# Patient Record
Sex: MALE | Race: AMERICAN INDIAN OR ALASKA NATIVE | NOT HISPANIC OR LATINO | URBAN - METROPOLITAN AREA
[De-identification: names, ages, dates, MRNs, and addresses within clinical notes are randomized per-mention and may not be internally consistent; named-entity substitution may affect disease eponyms.]

---

## 2017-03-11 ENCOUNTER — OUTPATIENT (OUTPATIENT)
Dept: OUTPATIENT SERVICES | Facility: HOSPITAL | Age: 72
LOS: 1 days | Discharge: HOME | End: 2017-03-11

## 2017-06-27 DIAGNOSIS — Z79.891 LONG TERM (CURRENT) USE OF OPIATE ANALGESIC: ICD-10-CM

## 2017-06-27 DIAGNOSIS — N18.4 CHRONIC KIDNEY DISEASE, STAGE 4 (SEVERE): ICD-10-CM

## 2017-06-27 DIAGNOSIS — Z94.1 HEART TRANSPLANT STATUS: ICD-10-CM

## 2017-10-20 ENCOUNTER — APPOINTMENT (OUTPATIENT)
Dept: VASCULAR SURGERY | Facility: CLINIC | Age: 72
End: 2017-10-20
Payer: COMMERCIAL

## 2017-10-20 VITALS
DIASTOLIC BLOOD PRESSURE: 54 MMHG | HEIGHT: 63 IN | SYSTOLIC BLOOD PRESSURE: 110 MMHG | BODY MASS INDEX: 26.75 KG/M2 | WEIGHT: 151 LBS

## 2017-10-20 DIAGNOSIS — E03.9 HYPOTHYROIDISM, UNSPECIFIED: ICD-10-CM

## 2017-10-20 DIAGNOSIS — Z87.891 PERSONAL HISTORY OF NICOTINE DEPENDENCE: ICD-10-CM

## 2017-10-20 DIAGNOSIS — N18.4 CHRONIC KIDNEY DISEASE, STAGE 4 (SEVERE): ICD-10-CM

## 2017-10-20 DIAGNOSIS — I10 ESSENTIAL (PRIMARY) HYPERTENSION: ICD-10-CM

## 2017-10-20 DIAGNOSIS — I25.10 ATHEROSCLEROTIC HEART DISEASE OF NATIVE CORONARY ARTERY W/OUT ANGINA PECTORIS: ICD-10-CM

## 2017-10-20 DIAGNOSIS — E11.9 TYPE 2 DIABETES MELLITUS W/OUT COMPLICATIONS: ICD-10-CM

## 2017-10-20 PROBLEM — Z00.00 ENCOUNTER FOR PREVENTIVE HEALTH EXAMINATION: Status: ACTIVE | Noted: 2017-10-20

## 2017-10-20 PROCEDURE — 99203 OFFICE O/P NEW LOW 30 MIN: CPT

## 2017-10-20 PROCEDURE — 93925 LOWER EXTREMITY STUDY: CPT

## 2017-10-20 PROCEDURE — 93978 VASCULAR STUDY: CPT

## 2017-10-20 RX ORDER — AMLODIPINE BESYLATE 10 MG/1
10 TABLET ORAL
Refills: 0 | Status: ACTIVE | COMMUNITY

## 2017-10-20 RX ORDER — INSULIN DEGLUDEC INJECTION 200 U/ML
INJECTION, SOLUTION SUBCUTANEOUS
Refills: 0 | Status: ACTIVE | COMMUNITY

## 2017-10-20 RX ORDER — PENTOXIFYLLINE 400 MG/1
400 TABLET, EXTENDED RELEASE ORAL
Refills: 0 | Status: ACTIVE | COMMUNITY

## 2017-10-20 RX ORDER — ROSUVASTATIN CALCIUM 20 MG/1
20 TABLET, FILM COATED ORAL
Refills: 0 | Status: ACTIVE | COMMUNITY

## 2017-10-20 RX ORDER — INSULIN LISPRO 100 [IU]/ML
INJECTION, SOLUTION INTRAVENOUS; SUBCUTANEOUS
Refills: 0 | Status: ACTIVE | COMMUNITY

## 2017-10-20 RX ORDER — GINGER ROOT/GINGER ROOT EXT 262.5 MG
CAPSULE ORAL
Refills: 0 | Status: ACTIVE | COMMUNITY

## 2017-10-20 RX ORDER — GLUCOSAMINE/MSM/CHONDROIT SULF 500-166.6
TABLET ORAL
Refills: 0 | Status: ACTIVE | COMMUNITY

## 2017-10-20 RX ORDER — PREDNISONE 1 MG/1
1 TABLET ORAL
Refills: 0 | Status: ACTIVE | COMMUNITY

## 2017-10-20 RX ORDER — LEVOTHYROXINE SODIUM 0.17 MG/1
TABLET ORAL
Refills: 0 | Status: ACTIVE | COMMUNITY

## 2017-10-20 RX ORDER — FERROUS SULFATE 325(65) MG
325 (65 FE) TABLET ORAL
Refills: 0 | Status: ACTIVE | COMMUNITY

## 2017-10-20 RX ORDER — MYCOPHENOLATE MOFETIL 250 MG/1
250 CAPSULE ORAL
Refills: 0 | Status: ACTIVE | COMMUNITY

## 2017-10-20 RX ORDER — EPLERENONE 50 MG/1
50 TABLET, COATED ORAL
Refills: 0 | Status: ACTIVE | COMMUNITY

## 2017-10-20 RX ORDER — LOSARTAN POTASSIUM 100 MG/1
100 TABLET, FILM COATED ORAL
Refills: 0 | Status: ACTIVE | COMMUNITY

## 2017-10-20 RX ORDER — FENOFIBRATE 54 MG/1
54 TABLET ORAL
Refills: 0 | Status: ACTIVE | COMMUNITY

## 2017-10-20 RX ORDER — DOCUSATE SODIUM 100 MG/1
TABLET ORAL
Refills: 0 | Status: ACTIVE | COMMUNITY

## 2017-10-20 RX ORDER — ASPIRIN 325 MG/1
325 TABLET, FILM COATED ORAL
Refills: 0 | Status: ACTIVE | COMMUNITY

## 2018-01-13 ENCOUNTER — OUTPATIENT (OUTPATIENT)
Dept: OUTPATIENT SERVICES | Facility: HOSPITAL | Age: 73
LOS: 1 days | Discharge: HOME | End: 2018-01-13

## 2018-01-13 DIAGNOSIS — I10 ESSENTIAL (PRIMARY) HYPERTENSION: ICD-10-CM

## 2018-01-13 DIAGNOSIS — R10.9 UNSPECIFIED ABDOMINAL PAIN: ICD-10-CM

## 2018-01-13 DIAGNOSIS — R79.82 ELEVATED C-REACTIVE PROTEIN (CRP): ICD-10-CM

## 2018-04-20 ENCOUNTER — APPOINTMENT (OUTPATIENT)
Dept: VASCULAR SURGERY | Facility: CLINIC | Age: 73
End: 2018-04-20
Payer: COMMERCIAL

## 2018-04-20 VITALS
DIASTOLIC BLOOD PRESSURE: 64 MMHG | BODY MASS INDEX: 26.58 KG/M2 | HEIGHT: 63 IN | WEIGHT: 150 LBS | SYSTOLIC BLOOD PRESSURE: 118 MMHG

## 2018-04-20 DIAGNOSIS — I73.9 PERIPHERAL VASCULAR DISEASE, UNSPECIFIED: ICD-10-CM

## 2018-04-20 PROCEDURE — 99213 OFFICE O/P EST LOW 20 MIN: CPT

## 2018-04-20 PROCEDURE — 93880 EXTRACRANIAL BILAT STUDY: CPT

## 2018-07-26 ENCOUNTER — EMERGENCY (EMERGENCY)
Facility: HOSPITAL | Age: 73
LOS: 0 days | Discharge: HOME | End: 2018-07-26
Attending: EMERGENCY MEDICINE | Admitting: EMERGENCY MEDICINE

## 2018-07-26 VITALS
WEIGHT: 149.91 LBS | HEIGHT: 63 IN | SYSTOLIC BLOOD PRESSURE: 166 MMHG | OXYGEN SATURATION: 97 % | RESPIRATION RATE: 20 BRPM | HEART RATE: 107 BPM | TEMPERATURE: 97 F | DIASTOLIC BLOOD PRESSURE: 74 MMHG

## 2018-07-26 VITALS — SYSTOLIC BLOOD PRESSURE: 124 MMHG | TEMPERATURE: 97 F | HEART RATE: 96 BPM | DIASTOLIC BLOOD PRESSURE: 61 MMHG

## 2018-07-26 DIAGNOSIS — Z79.82 LONG TERM (CURRENT) USE OF ASPIRIN: ICD-10-CM

## 2018-07-26 DIAGNOSIS — E03.9 HYPOTHYROIDISM, UNSPECIFIED: ICD-10-CM

## 2018-07-26 DIAGNOSIS — Z79.4 LONG TERM (CURRENT) USE OF INSULIN: ICD-10-CM

## 2018-07-26 DIAGNOSIS — Z94.1 HEART TRANSPLANT STATUS: Chronic | ICD-10-CM

## 2018-07-26 DIAGNOSIS — R39.89 OTHER SYMPTOMS AND SIGNS INVOLVING THE GENITOURINARY SYSTEM: ICD-10-CM

## 2018-07-26 DIAGNOSIS — N18.4 CHRONIC KIDNEY DISEASE, STAGE 4 (SEVERE): ICD-10-CM

## 2018-07-26 DIAGNOSIS — N50.811 RIGHT TESTICULAR PAIN: ICD-10-CM

## 2018-07-26 DIAGNOSIS — I12.9 HYPERTENSIVE CHRONIC KIDNEY DISEASE WITH STAGE 1 THROUGH STAGE 4 CHRONIC KIDNEY DISEASE, OR UNSPECIFIED CHRONIC KIDNEY DISEASE: ICD-10-CM

## 2018-07-26 DIAGNOSIS — R10.31 RIGHT LOWER QUADRANT PAIN: ICD-10-CM

## 2018-07-26 DIAGNOSIS — Z79.899 OTHER LONG TERM (CURRENT) DRUG THERAPY: ICD-10-CM

## 2018-07-26 DIAGNOSIS — Z95.5 PRESENCE OF CORONARY ANGIOPLASTY IMPLANT AND GRAFT: ICD-10-CM

## 2018-07-26 DIAGNOSIS — N50.812 LEFT TESTICULAR PAIN: ICD-10-CM

## 2018-07-26 DIAGNOSIS — R10.30 LOWER ABDOMINAL PAIN, UNSPECIFIED: ICD-10-CM

## 2018-07-26 DIAGNOSIS — E11.9 TYPE 2 DIABETES MELLITUS WITHOUT COMPLICATIONS: ICD-10-CM

## 2018-07-26 LAB
ALBUMIN SERPL ELPH-MCNC: 4.2 G/DL — SIGNIFICANT CHANGE UP (ref 3.5–5.2)
ALP SERPL-CCNC: 49 U/L — SIGNIFICANT CHANGE UP (ref 30–115)
ALT FLD-CCNC: 16 U/L — SIGNIFICANT CHANGE UP (ref 0–41)
ANION GAP SERPL CALC-SCNC: 16 MMOL/L — HIGH (ref 7–14)
APPEARANCE UR: CLEAR — SIGNIFICANT CHANGE UP
AST SERPL-CCNC: 16 U/L — SIGNIFICANT CHANGE UP (ref 0–41)
BILIRUB SERPL-MCNC: <0.2 MG/DL — SIGNIFICANT CHANGE UP (ref 0.2–1.2)
BILIRUB UR-MCNC: NEGATIVE — SIGNIFICANT CHANGE UP
BUN SERPL-MCNC: 36 MG/DL — HIGH (ref 10–20)
CALCIUM SERPL-MCNC: 9.7 MG/DL — SIGNIFICANT CHANGE UP (ref 8.5–10.1)
CHLORIDE SERPL-SCNC: 103 MMOL/L — SIGNIFICANT CHANGE UP (ref 98–110)
CO2 SERPL-SCNC: 21 MMOL/L — SIGNIFICANT CHANGE UP (ref 17–32)
COLOR SPEC: YELLOW — SIGNIFICANT CHANGE UP
CREAT SERPL-MCNC: 2.6 MG/DL — HIGH (ref 0.7–1.5)
DIFF PNL FLD: ABNORMAL
EPI CELLS # UR: ABNORMAL /HPF
GLUCOSE SERPL-MCNC: 239 MG/DL — HIGH (ref 70–99)
GLUCOSE UR QL: 250
HCT VFR BLD CALC: 33.2 % — LOW (ref 42–52)
HGB BLD-MCNC: 11.1 G/DL — LOW (ref 14–18)
KETONES UR-MCNC: NEGATIVE — SIGNIFICANT CHANGE UP
LACTATE SERPL-SCNC: 1.5 MMOL/L — SIGNIFICANT CHANGE UP (ref 0.5–2.2)
LEUKOCYTE ESTERASE UR-ACNC: NEGATIVE — SIGNIFICANT CHANGE UP
LIDOCAIN IGE QN: 62 U/L — HIGH (ref 7–60)
MCHC RBC-ENTMCNC: 29.8 PG — SIGNIFICANT CHANGE UP (ref 27–31)
MCHC RBC-ENTMCNC: 33.4 G/DL — SIGNIFICANT CHANGE UP (ref 32–37)
MCV RBC AUTO: 89 FL — SIGNIFICANT CHANGE UP (ref 80–94)
NITRITE UR-MCNC: NEGATIVE — SIGNIFICANT CHANGE UP
NRBC # BLD: 0 /100 WBCS — SIGNIFICANT CHANGE UP (ref 0–0)
PH UR: 5.5 — SIGNIFICANT CHANGE UP (ref 5–8)
PLATELET # BLD AUTO: 261 K/UL — SIGNIFICANT CHANGE UP (ref 130–400)
POTASSIUM SERPL-MCNC: 4.4 MMOL/L — SIGNIFICANT CHANGE UP (ref 3.5–5)
POTASSIUM SERPL-SCNC: 4.4 MMOL/L — SIGNIFICANT CHANGE UP (ref 3.5–5)
PROT SERPL-MCNC: 6.7 G/DL — SIGNIFICANT CHANGE UP (ref 6–8)
PROT UR-MCNC: >=300 MG/DL — SIGNIFICANT CHANGE UP
RBC # BLD: 3.73 M/UL — LOW (ref 4.7–6.1)
RBC # FLD: 13.2 % — SIGNIFICANT CHANGE UP (ref 11.5–14.5)
RBC CASTS # UR COMP ASSIST: SIGNIFICANT CHANGE UP /HPF
SODIUM SERPL-SCNC: 140 MMOL/L — SIGNIFICANT CHANGE UP (ref 135–146)
SP GR SPEC: 1.01 — SIGNIFICANT CHANGE UP (ref 1.01–1.03)
UROBILINOGEN FLD QL: 0.2 — SIGNIFICANT CHANGE UP (ref 0.2–0.2)
WBC # BLD: 6.93 K/UL — SIGNIFICANT CHANGE UP (ref 4.8–10.8)
WBC # FLD AUTO: 6.93 K/UL — SIGNIFICANT CHANGE UP (ref 4.8–10.8)
WBC UR QL: SIGNIFICANT CHANGE UP /HPF

## 2018-07-26 RX ORDER — SODIUM CHLORIDE 9 MG/ML
1000 INJECTION INTRAMUSCULAR; INTRAVENOUS; SUBCUTANEOUS ONCE
Qty: 0 | Refills: 0 | Status: COMPLETED | OUTPATIENT
Start: 2018-07-26 | End: 2018-07-26

## 2018-07-26 RX ADMIN — SODIUM CHLORIDE 1000 MILLILITER(S): 9 INJECTION INTRAMUSCULAR; INTRAVENOUS; SUBCUTANEOUS at 14:27

## 2018-07-26 NOTE — ED PROVIDER NOTE - CARE PLAN
Principal Discharge DX:	Testicular pain Principal Discharge DX:	Testicular pain  Secondary Diagnosis:	Abdominal pain

## 2018-07-26 NOTE — ED PROVIDER NOTE - PHYSICAL EXAMINATION
PHYSICAL EXAM:    GENERAL: Alert, appears stated age, well appearing, non-toxic  SKIN: Warm, pink and dry. MMM.   EYE: Normal lids/conjunctiva  ENT: Normal hearing, patent oropharynx  NECK: +supple. No meningismus, JVD.   Pulm: Bilateral BS, normal resp effort, no wheezes, stridor, or retractions  CV: RRR, no M/R/G, 2+ pulses   Abd: soft, non-distended, +mild TTP to pinpoint area of RLQ. no CVA tenderness. no psoas, obturator, rovsing, rebound, guarding. +green and brown ecchymoses on abdomen which pt relates are sites of insulin injection, no signs of infection.   : performed by DR. Govea. external genitalia WNL and without lesions. testicles descended bilaterally. +cremasteric reflexes bilaterally. no hernias on valsalva. b/l testicles mildly tender to palpation. no discharge. Chaperoned by MAURA Mason. no skin changes.   Neuro: AAOx3. normal gait.

## 2018-07-26 NOTE — ED PROVIDER NOTE - OBJECTIVE STATEMENT
74 y/o M with PM HTN, HLD, hypothyroidism, DM with stage 4 CKD on long and short acting insulin, on asa, s/p cardiac transplant (done at [facility]) x 11 years without complication on Cellcept, anti-proliferative agent and prednisone, ?hx of L inguinal hernia, ?hx of L undescended testicle presents with L scrotal pain x wks gradually becoming worse and peaking in pain in last 2 days. +burning on urination. presents with wife. Denies skin changes, swelling, discharge, penile pain, abdominal pain. only abdominal/ surgery in past is cholecystectomy. Denies CP, palpitations, SOB, back pain, n/v/d, black or bloody stools, fevers, sweats, chills, trauma, fall, cough, recent travel, recent illness, sick contacts, leg pain/swelling, rash.

## 2018-07-26 NOTE — ED PROVIDER NOTE - MEDICAL DECISION MAKING DETAILS
Pt with w/u for source of testicular pain in the ED.  I discussed with patient treatment, need to follow-up as well as reasons to return and they agree.  Copies of results were reviewed with and given to patient to bring to f/u.

## 2018-07-26 NOTE — ED PROVIDER NOTE - PROGRESS NOTE DETAILS
pending labs, imaging. declines medication for pain at this time. Counseled on red flags and to return for them. Counseled on importance of follow up. Patient repeats back instructions. Patient advised that they or their doctor may call [phone] to follow up on the specific results of the tests performed today in the emergency department.   Patient appears well on discharge.

## 2018-07-27 LAB
CULTURE RESULTS: SIGNIFICANT CHANGE UP
SPECIMEN SOURCE: SIGNIFICANT CHANGE UP

## 2018-08-27 ENCOUNTER — APPOINTMENT (OUTPATIENT)
Dept: SURGERY | Facility: CLINIC | Age: 73
End: 2018-08-27

## 2018-10-19 ENCOUNTER — APPOINTMENT (OUTPATIENT)
Dept: VASCULAR SURGERY | Facility: CLINIC | Age: 73
End: 2018-10-19
Payer: COMMERCIAL

## 2018-10-19 ENCOUNTER — OTHER (OUTPATIENT)
Age: 73
End: 2018-10-19

## 2018-10-19 VITALS
SYSTOLIC BLOOD PRESSURE: 120 MMHG | HEIGHT: 63 IN | WEIGHT: 150 LBS | DIASTOLIC BLOOD PRESSURE: 70 MMHG | BODY MASS INDEX: 26.58 KG/M2

## 2018-10-19 PROBLEM — N28.9 DISORDER OF KIDNEY AND URETER, UNSPECIFIED: Chronic | Status: ACTIVE | Noted: 2018-07-26

## 2018-10-19 PROBLEM — E11.9 TYPE 2 DIABETES MELLITUS WITHOUT COMPLICATIONS: Chronic | Status: ACTIVE | Noted: 2018-07-26

## 2018-10-19 PROBLEM — E03.9 HYPOTHYROIDISM, UNSPECIFIED: Chronic | Status: ACTIVE | Noted: 2018-07-26

## 2018-10-19 PROBLEM — I10 ESSENTIAL (PRIMARY) HYPERTENSION: Chronic | Status: ACTIVE | Noted: 2018-07-26

## 2018-10-19 PROCEDURE — 93880 EXTRACRANIAL BILAT STUDY: CPT

## 2018-10-19 PROCEDURE — 99213 OFFICE O/P EST LOW 20 MIN: CPT

## 2019-04-26 ENCOUNTER — APPOINTMENT (OUTPATIENT)
Dept: VASCULAR SURGERY | Facility: CLINIC | Age: 74
End: 2019-04-26
Payer: COMMERCIAL

## 2019-04-26 ENCOUNTER — OTHER (OUTPATIENT)
Age: 74
End: 2019-04-26

## 2019-04-26 VITALS — SYSTOLIC BLOOD PRESSURE: 120 MMHG | BODY MASS INDEX: 25.51 KG/M2 | WEIGHT: 144 LBS | DIASTOLIC BLOOD PRESSURE: 60 MMHG

## 2019-04-26 PROCEDURE — 99213 OFFICE O/P EST LOW 20 MIN: CPT

## 2019-04-26 PROCEDURE — 93880 EXTRACRANIAL BILAT STUDY: CPT

## 2019-04-26 NOTE — HISTORY OF PRESENT ILLNESS
[FreeTextEntry1] : Mr. Almazan is a 73 year-old gentleman with history of PVD, CABG which was followed by heart transplant, stage 4 kidney disease, not on HD presents for evaluation of PVD. \par \par He presents for followup of carotid disease. He denies any history of CVA or TIA.

## 2019-04-26 NOTE — DATA REVIEWED
[FreeTextEntry1] : I performed a carotid duplex that showed 50-69% stenosis of left ICA and  CCA with > 70% stenosis  extending into ICA and ECA.

## 2019-04-26 NOTE — CONSULT LETTER
[Courtesy Letter:] : I had the pleasure of seeing your patient, [unfilled], in my office today. [Dear  ___] : Dear  [unfilled], [Please see my note below.] : Please see my note below. [FreeTextEntry2] : Dear Dr. Willard Snider,

## 2019-04-26 NOTE — ASSESSMENT
[FreeTextEntry1] : Mr. Almazan is a 73 year-old gentleman with history of PVD, CABG which was followed by heart transplant, stage 4 kidney disease. I have explained to him that since he has stage 4 kidney disease, any IV contrast exposure may lead to renal failure requiring hemodialysis.\par I performed a carotid duplex that showed 50-69% stenosis of left ICA and  CCA with > 70% stenosis  extending into ICA and ECA, tht has worsened since last testing. He has been asymptomatic and his renal disease has been stable. He has moderate to severe right ICA stenosis. I recommend conservative management.\par He is agreement with the plan and I will see him back for follow up in six months time and repeat the carotid duplex at the time.

## 2019-08-27 ENCOUNTER — APPOINTMENT (OUTPATIENT)
Dept: VASCULAR SURGERY | Facility: CLINIC | Age: 74
End: 2019-08-27
Payer: COMMERCIAL

## 2019-08-27 VITALS — SYSTOLIC BLOOD PRESSURE: 120 MMHG | DIASTOLIC BLOOD PRESSURE: 70 MMHG

## 2019-08-27 DIAGNOSIS — I70.0 ATHEROSCLEROSIS OF AORTA: ICD-10-CM

## 2019-08-27 DIAGNOSIS — I70.213 ATHEROSCLEROSIS OF NATIVE ARTERIES OF EXTREMITIES WITH INTERMITTENT CLAUDICATION, BILATERAL LEGS: ICD-10-CM

## 2019-08-27 PROCEDURE — 99213 OFFICE O/P EST LOW 20 MIN: CPT

## 2019-08-27 PROCEDURE — 93978 VASCULAR STUDY: CPT

## 2019-08-27 PROCEDURE — 93925 LOWER EXTREMITY STUDY: CPT

## 2019-08-27 NOTE — CONSULT LETTER
[Dear  ___] : Dear  [unfilled], [Please see my note below.] : Please see my note below. [Courtesy Letter:] : I had the pleasure of seeing your patient, [unfilled], in my office today. [FreeTextEntry2] : Dear Dr. Willard Snider,

## 2019-08-27 NOTE — ASSESSMENT
[FreeTextEntry1] : Mr. Almazan is a 73 year-old gentleman with history of PVD, CABG which was followed by heart transplant, stage 4 kidney disease. I have explained to him that since he has stage 4 kidney disease, any IV contrast exposure may lead to renal failure requiring hemodialysis.\par I performed a arterial duplex that showed right SFA stenosis however his symptoms are most likely related to his lower back. With regards to the patient's carotid disease I recommend conservative management for now given his chronic kidney disease I will see him back in my office in 3 months time for repeat carotid duplex exam or sooner if any new symptoms develop.

## 2019-08-27 NOTE — HISTORY OF PRESENT ILLNESS
[FreeTextEntry1] : Mr. Almazan is a 73 year-old gentleman with history of PVD, CABG which was followed by heart transplant, stage 4 kidney disease, not on HD presents with know right ICA stenosis. Today he presents for evaluation of PVD. He complained of buttock pain radiating down his posterior thigh\par \par

## 2019-10-15 ENCOUNTER — APPOINTMENT (OUTPATIENT)
Dept: VASCULAR SURGERY | Facility: CLINIC | Age: 74
End: 2019-10-15

## 2019-10-25 ENCOUNTER — APPOINTMENT (OUTPATIENT)
Dept: VASCULAR SURGERY | Facility: CLINIC | Age: 74
End: 2019-10-25
Payer: COMMERCIAL

## 2019-10-25 ENCOUNTER — OTHER (OUTPATIENT)
Age: 74
End: 2019-10-25

## 2019-10-25 VITALS — SYSTOLIC BLOOD PRESSURE: 122 MMHG | DIASTOLIC BLOOD PRESSURE: 74 MMHG

## 2019-10-25 DIAGNOSIS — I65.23 OCCLUSION AND STENOSIS OF BILATERAL CAROTID ARTERIES: ICD-10-CM

## 2019-10-25 PROCEDURE — 93880 EXTRACRANIAL BILAT STUDY: CPT

## 2019-10-25 PROCEDURE — 99213 OFFICE O/P EST LOW 20 MIN: CPT

## 2019-10-25 NOTE — ASSESSMENT
[FreeTextEntry1] : Mr. Almazan is a 73 year-old gentleman with history of PVD, CABG which was followed by heart transplant, stage 4 kidney disease. I have explained to him that since he has stage 4 kidney disease, any IV contrast exposure may lead to renal failure requiring hemodialysis.\par He has known right SFA stenosis however his symptoms are stable. With regards to the patient's carotid disease I recommend conservative management for now given his chronic kidney disease. I will see him back in my office in 6 months time for repeat carotid duplex exam or sooner if any new symptoms develop.

## 2019-10-25 NOTE — DATA REVIEWED
[FreeTextEntry1] : I performed a carotid duplex that showed 50-69% stenosis of right ICA and left CCA with > 70% stenosis  extending into ICA and ECA.

## 2019-10-25 NOTE — HISTORY OF PRESENT ILLNESS
[FreeTextEntry1] : Mr. Almazan is a 74 year-old gentleman with history of PVD, CABG which was followed by heart transplant, stage 4 kidney disease, not on HD presents with know right ICA stenosis. Today he presents for evaluation of carotid stenosis. He denies any CVA or TIA symptoms.\par

## 2020-03-24 ENCOUNTER — INPATIENT (INPATIENT)
Facility: HOSPITAL | Age: 75
LOS: 1 days | Discharge: HOME | End: 2020-03-26
Attending: INTERNAL MEDICINE | Admitting: INTERNAL MEDICINE
Payer: MEDICARE

## 2020-03-24 VITALS
SYSTOLIC BLOOD PRESSURE: 123 MMHG | HEART RATE: 121 BPM | DIASTOLIC BLOOD PRESSURE: 62 MMHG | WEIGHT: 143.96 LBS | TEMPERATURE: 103 F | OXYGEN SATURATION: 95 % | RESPIRATION RATE: 20 BRPM

## 2020-03-24 DIAGNOSIS — Z94.1 HEART TRANSPLANT STATUS: Chronic | ICD-10-CM

## 2020-03-24 LAB
ANION GAP SERPL CALC-SCNC: 16 MMOL/L — HIGH (ref 7–14)
BASOPHILS # BLD AUTO: 0.01 K/UL — SIGNIFICANT CHANGE UP (ref 0–0.2)
BASOPHILS NFR BLD AUTO: 0.1 % — SIGNIFICANT CHANGE UP (ref 0–1)
BUN SERPL-MCNC: 36 MG/DL — HIGH (ref 10–20)
CALCIUM SERPL-MCNC: 8.5 MG/DL — SIGNIFICANT CHANGE UP (ref 8.5–10.1)
CHLORIDE SERPL-SCNC: 105 MMOL/L — SIGNIFICANT CHANGE UP (ref 98–110)
CO2 SERPL-SCNC: 17 MMOL/L — SIGNIFICANT CHANGE UP (ref 17–32)
CREAT SERPL-MCNC: 3.6 MG/DL — HIGH (ref 0.7–1.5)
EOSINOPHIL # BLD AUTO: 0 K/UL — SIGNIFICANT CHANGE UP (ref 0–0.7)
EOSINOPHIL NFR BLD AUTO: 0 % — SIGNIFICANT CHANGE UP (ref 0–8)
FLU A RESULT: NEGATIVE — SIGNIFICANT CHANGE UP
FLU A RESULT: NEGATIVE — SIGNIFICANT CHANGE UP
FLUAV AG NPH QL: NEGATIVE — SIGNIFICANT CHANGE UP
FLUBV AG NPH QL: NEGATIVE — SIGNIFICANT CHANGE UP
GLUCOSE BLDC GLUCOMTR-MCNC: 127 MG/DL — HIGH (ref 70–99)
GLUCOSE BLDC GLUCOMTR-MCNC: 135 MG/DL — HIGH (ref 70–99)
GLUCOSE SERPL-MCNC: 107 MG/DL — HIGH (ref 70–99)
HCT VFR BLD CALC: 28.8 % — LOW (ref 42–52)
HGB BLD-MCNC: 9.1 G/DL — LOW (ref 14–18)
IMM GRANULOCYTES NFR BLD AUTO: 0.3 % — SIGNIFICANT CHANGE UP (ref 0.1–0.3)
LACTATE SERPL-SCNC: 0.9 MMOL/L — SIGNIFICANT CHANGE UP (ref 0.7–2)
LYMPHOCYTES # BLD AUTO: 0.24 K/UL — LOW (ref 1.2–3.4)
LYMPHOCYTES # BLD AUTO: 3.2 % — LOW (ref 20.5–51.1)
MCHC RBC-ENTMCNC: 29.1 PG — SIGNIFICANT CHANGE UP (ref 27–31)
MCHC RBC-ENTMCNC: 31.6 G/DL — LOW (ref 32–37)
MCV RBC AUTO: 92 FL — SIGNIFICANT CHANGE UP (ref 80–94)
MONOCYTES # BLD AUTO: 0.78 K/UL — HIGH (ref 0.1–0.6)
MONOCYTES NFR BLD AUTO: 10.2 % — HIGH (ref 1.7–9.3)
NEUTROPHILS # BLD AUTO: 6.56 K/UL — HIGH (ref 1.4–6.5)
NEUTROPHILS NFR BLD AUTO: 86.2 % — HIGH (ref 42.2–75.2)
NRBC # BLD: 0 /100 WBCS — SIGNIFICANT CHANGE UP (ref 0–0)
NT-PROBNP SERPL-SCNC: 401 PG/ML — HIGH (ref 0–300)
PLATELET # BLD AUTO: 189 K/UL — SIGNIFICANT CHANGE UP (ref 130–400)
POTASSIUM SERPL-MCNC: 3.2 MMOL/L — LOW (ref 3.5–5)
POTASSIUM SERPL-SCNC: 3.2 MMOL/L — LOW (ref 3.5–5)
RBC # BLD: 3.13 M/UL — LOW (ref 4.7–6.1)
RBC # FLD: 14.1 % — SIGNIFICANT CHANGE UP (ref 11.5–14.5)
RSV RESULT: NEGATIVE — SIGNIFICANT CHANGE UP
RSV RNA RESP QL NAA+PROBE: NEGATIVE — SIGNIFICANT CHANGE UP
SODIUM SERPL-SCNC: 138 MMOL/L — SIGNIFICANT CHANGE UP (ref 135–146)
TROPONIN T SERPL-MCNC: 0.03 NG/ML — CRITICAL HIGH
WBC # BLD: 7.61 K/UL — SIGNIFICANT CHANGE UP (ref 4.8–10.8)
WBC # FLD AUTO: 7.61 K/UL — SIGNIFICANT CHANGE UP (ref 4.8–10.8)

## 2020-03-24 PROCEDURE — 99223 1ST HOSP IP/OBS HIGH 75: CPT | Mod: AI

## 2020-03-24 PROCEDURE — 99285 EMERGENCY DEPT VISIT HI MDM: CPT

## 2020-03-24 PROCEDURE — 71045 X-RAY EXAM CHEST 1 VIEW: CPT | Mod: 26

## 2020-03-24 RX ORDER — INSULIN LISPRO 100/ML
VIAL (ML) SUBCUTANEOUS
Refills: 0 | Status: DISCONTINUED | OUTPATIENT
Start: 2020-03-24 | End: 2020-03-26

## 2020-03-24 RX ORDER — INFLUENZA VIRUS VACCINE 15; 15; 15; 15 UG/.5ML; UG/.5ML; UG/.5ML; UG/.5ML
0.5 SUSPENSION INTRAMUSCULAR ONCE
Refills: 0 | Status: DISCONTINUED | OUTPATIENT
Start: 2020-03-24 | End: 2020-03-26

## 2020-03-24 RX ORDER — MYCOPHENOLATE MOFETIL 250 MG/1
1000 CAPSULE ORAL
Refills: 0 | Status: DISCONTINUED | OUTPATIENT
Start: 2020-03-24 | End: 2020-03-26

## 2020-03-24 RX ORDER — DEXTROSE 50 % IN WATER 50 %
12.5 SYRINGE (ML) INTRAVENOUS ONCE
Refills: 0 | Status: DISCONTINUED | OUTPATIENT
Start: 2020-03-24 | End: 2020-03-26

## 2020-03-24 RX ORDER — MYCOPHENOLATE MOFETIL 250 MG/1
1000 CAPSULE ORAL DAILY
Refills: 0 | Status: DISCONTINUED | OUTPATIENT
Start: 2020-03-24 | End: 2020-03-24

## 2020-03-24 RX ORDER — FENOFIBRATE,MICRONIZED 130 MG
48 CAPSULE ORAL DAILY
Refills: 0 | Status: DISCONTINUED | OUTPATIENT
Start: 2020-03-24 | End: 2020-03-26

## 2020-03-24 RX ORDER — ACETAMINOPHEN 500 MG
650 TABLET ORAL EVERY 6 HOURS
Refills: 0 | Status: DISCONTINUED | OUTPATIENT
Start: 2020-03-24 | End: 2020-03-24

## 2020-03-24 RX ORDER — CEFTRIAXONE 500 MG/1
1000 INJECTION, POWDER, FOR SOLUTION INTRAMUSCULAR; INTRAVENOUS EVERY 24 HOURS
Refills: 0 | Status: DISCONTINUED | OUTPATIENT
Start: 2020-03-25 | End: 2020-03-26

## 2020-03-24 RX ORDER — SODIUM CHLORIDE 9 MG/ML
1000 INJECTION, SOLUTION INTRAVENOUS
Refills: 0 | Status: DISCONTINUED | OUTPATIENT
Start: 2020-03-24 | End: 2020-03-26

## 2020-03-24 RX ORDER — EVEROLIMUS 10 MG/1
10 TABLET ORAL DAILY
Refills: 0 | Status: DISCONTINUED | OUTPATIENT
Start: 2020-03-24 | End: 2020-03-24

## 2020-03-24 RX ORDER — PENTOXIFYLLINE 400 MG
800 TABLET, EXTENDED RELEASE ORAL DAILY
Refills: 0 | Status: DISCONTINUED | OUTPATIENT
Start: 2020-03-24 | End: 2020-03-26

## 2020-03-24 RX ORDER — ACETAMINOPHEN 500 MG
975 TABLET ORAL ONCE
Refills: 0 | Status: COMPLETED | OUTPATIENT
Start: 2020-03-24 | End: 2020-03-24

## 2020-03-24 RX ORDER — ACETAMINOPHEN 500 MG
650 TABLET ORAL EVERY 6 HOURS
Refills: 0 | Status: DISCONTINUED | OUTPATIENT
Start: 2020-03-24 | End: 2020-03-26

## 2020-03-24 RX ORDER — PENTOXIFYLLINE 400 MG
400 TABLET, EXTENDED RELEASE ORAL AT BEDTIME
Refills: 0 | Status: DISCONTINUED | OUTPATIENT
Start: 2020-03-24 | End: 2020-03-26

## 2020-03-24 RX ORDER — IPRATROPIUM/ALBUTEROL SULFATE 18-103MCG
3 AEROSOL WITH ADAPTER (GRAM) INHALATION EVERY 6 HOURS
Refills: 0 | Status: DISCONTINUED | OUTPATIENT
Start: 2020-03-24 | End: 2020-03-26

## 2020-03-24 RX ORDER — DEXTROSE 50 % IN WATER 50 %
25 SYRINGE (ML) INTRAVENOUS ONCE
Refills: 0 | Status: DISCONTINUED | OUTPATIENT
Start: 2020-03-24 | End: 2020-03-26

## 2020-03-24 RX ORDER — EVEROLIMUS 10 MG/1
10 TABLET ORAL DAILY
Refills: 0 | Status: DISCONTINUED | OUTPATIENT
Start: 2020-03-24 | End: 2020-03-25

## 2020-03-24 RX ORDER — CEFTRIAXONE 500 MG/1
1000 INJECTION, POWDER, FOR SOLUTION INTRAMUSCULAR; INTRAVENOUS ONCE
Refills: 0 | Status: COMPLETED | OUTPATIENT
Start: 2020-03-24 | End: 2020-03-24

## 2020-03-24 RX ORDER — GLUCAGON INJECTION, SOLUTION 0.5 MG/.1ML
1 INJECTION, SOLUTION SUBCUTANEOUS ONCE
Refills: 0 | Status: DISCONTINUED | OUTPATIENT
Start: 2020-03-24 | End: 2020-03-26

## 2020-03-24 RX ORDER — AZITHROMYCIN 500 MG/1
500 TABLET, FILM COATED ORAL ONCE
Refills: 0 | Status: COMPLETED | OUTPATIENT
Start: 2020-03-24 | End: 2020-03-24

## 2020-03-24 RX ORDER — ATORVASTATIN CALCIUM 80 MG/1
40 TABLET, FILM COATED ORAL AT BEDTIME
Refills: 0 | Status: DISCONTINUED | OUTPATIENT
Start: 2020-03-24 | End: 2020-03-26

## 2020-03-24 RX ORDER — LEVOTHYROXINE SODIUM 125 MCG
25 TABLET ORAL DAILY
Refills: 0 | Status: DISCONTINUED | OUTPATIENT
Start: 2020-03-24 | End: 2020-03-26

## 2020-03-24 RX ORDER — AZITHROMYCIN 500 MG/1
500 TABLET, FILM COATED ORAL EVERY 24 HOURS
Refills: 0 | Status: DISCONTINUED | OUTPATIENT
Start: 2020-03-25 | End: 2020-03-26

## 2020-03-24 RX ORDER — POTASSIUM CHLORIDE 20 MEQ
40 PACKET (EA) ORAL ONCE
Refills: 0 | Status: COMPLETED | OUTPATIENT
Start: 2020-03-24 | End: 2020-03-24

## 2020-03-24 RX ORDER — SODIUM CHLORIDE 9 MG/ML
250 INJECTION, SOLUTION INTRAVENOUS ONCE
Refills: 0 | Status: COMPLETED | OUTPATIENT
Start: 2020-03-24 | End: 2020-03-24

## 2020-03-24 RX ORDER — AMLODIPINE BESYLATE 2.5 MG/1
10 TABLET ORAL DAILY
Refills: 0 | Status: DISCONTINUED | OUTPATIENT
Start: 2020-03-24 | End: 2020-03-26

## 2020-03-24 RX ORDER — DEXTROSE 50 % IN WATER 50 %
15 SYRINGE (ML) INTRAVENOUS ONCE
Refills: 0 | Status: DISCONTINUED | OUTPATIENT
Start: 2020-03-24 | End: 2020-03-26

## 2020-03-24 RX ORDER — INSULIN GLARGINE 100 [IU]/ML
10 INJECTION, SOLUTION SUBCUTANEOUS AT BEDTIME
Refills: 0 | Status: DISCONTINUED | OUTPATIENT
Start: 2020-03-24 | End: 2020-03-26

## 2020-03-24 RX ORDER — ACETAMINOPHEN 500 MG
650 TABLET ORAL ONCE
Refills: 0 | Status: COMPLETED | OUTPATIENT
Start: 2020-03-24 | End: 2020-03-24

## 2020-03-24 RX ORDER — SPIRONOLACTONE 25 MG/1
50 TABLET, FILM COATED ORAL DAILY
Refills: 0 | Status: DISCONTINUED | OUTPATIENT
Start: 2020-03-24 | End: 2020-03-26

## 2020-03-24 RX ADMIN — Medication 650 MILLIGRAM(S): at 17:30

## 2020-03-24 RX ADMIN — Medication 0.1 MILLIGRAM(S): at 21:14

## 2020-03-24 RX ADMIN — ATORVASTATIN CALCIUM 40 MILLIGRAM(S): 80 TABLET, FILM COATED ORAL at 21:15

## 2020-03-24 RX ADMIN — Medication 650 MILLIGRAM(S): at 23:32

## 2020-03-24 RX ADMIN — Medication 2 MILLIGRAM(S): at 17:30

## 2020-03-24 RX ADMIN — AZITHROMYCIN 255 MILLIGRAM(S): 500 TABLET, FILM COATED ORAL at 12:40

## 2020-03-24 RX ADMIN — CEFTRIAXONE 100 MILLIGRAM(S): 500 INJECTION, POWDER, FOR SOLUTION INTRAMUSCULAR; INTRAVENOUS at 13:24

## 2020-03-24 RX ADMIN — Medication 650 MILLIGRAM(S): at 20:18

## 2020-03-24 RX ADMIN — Medication 650 MILLIGRAM(S): at 15:12

## 2020-03-24 RX ADMIN — SODIUM CHLORIDE 250 MILLILITER(S): 9 INJECTION, SOLUTION INTRAVENOUS at 09:23

## 2020-03-24 RX ADMIN — Medication 975 MILLIGRAM(S): at 09:23

## 2020-03-24 RX ADMIN — Medication 40 MILLIEQUIVALENT(S): at 14:44

## 2020-03-24 RX ADMIN — MYCOPHENOLATE MOFETIL 1000 MILLIGRAM(S): 250 CAPSULE ORAL at 17:30

## 2020-03-24 RX ADMIN — INSULIN GLARGINE 10 UNIT(S): 100 INJECTION, SOLUTION SUBCUTANEOUS at 21:14

## 2020-03-24 NOTE — CHART NOTE - NSCHARTNOTEFT_GEN_A_CORE
Patient with high fevers despite Tylenol. Chart reviewed, will not give NSAID's. I spoke to patient at bedside, sounds congested but no distress. Agrees to trial of ice packs, hypothermia blanket and will monitor.

## 2020-03-24 NOTE — ED PROVIDER NOTE - PROGRESS NOTE DETAILS
Dr. Livingston - patient has fever and history of heart transplant in midst of COVID19 outbreak. Pt's family contacted (Kiera Almazan) with request to contact his heart transplant doctors, who she states is [facility]. Awaiting callback. Dr. Livingston - patient's heart transplant team contacted, NP Matti Crouch. Phone contact number is . Recommendations included echo, ID consult, check everolimus throughs with maintenance @ 4-6. Recent results inclduing 2019 echo, PET sent but these screening results were normal per team for his known condition. Attendings recommend COVID19 testing prior to transfer to [facility] as that would be required for planning. discussed case with Dr. Thompson, will admit

## 2020-03-24 NOTE — ED PROVIDER NOTE - PHYSICAL EXAMINATION
VITAL SIGNS: I have reviewed nursing notes and confirm.  CONSTITUTIONAL: Well-developed; well-nourished; in no acute distress.   SKIN: skin exam is warm and dry, no acute rash.    HEAD: Normocephalic; atraumatic.  EYES:  conjunctiva and sclera clear.  ENT: No nasal discharge; airway clear.  NECK: Supple; non tender.  CARD: S1, S2 normal; no murmurs, gallops, or rubs. Regular rate and rhythm.   RESP: No wheezes, rales or rhonchi.  ABD: Normal bowel sounds; soft; non-distended; non-tender  EXT: Normal ROM.  No clubbing, cyanosis or edema.   LYMPH: No acute cervical adenopathy.  NEURO: Alert, oriented, grossly unremarkable  PSYCH: Cooperative, appropriate.

## 2020-03-24 NOTE — GOALS OF CARE CONVERSATION - ADVANCED CARE PLANNING - CONVERSATION DETAILS
Discussed gaols of care with patient and wife based on his comorbid condition of heart transplant in lieu of suspected COVID and possibility of intubation with mechanical ventilation if worsening difficulty breathing if needed. Patient and wife verbalized understanding and wish to be full code

## 2020-03-24 NOTE — ED PROVIDER NOTE - CLINICAL SUMMARY MEDICAL DECISION MAKING FREE TEXT BOX
74 male here for cough and chills in setting of COVID19 outbreak. Had screening labs imaging medications and reevaluation, plan is for inpatient admission for continued management. Not toxic appearing, case was d/w his known non local PMD team, recommendations made and prior results reviewed, will admit to inpatient setting.

## 2020-03-24 NOTE — H&P ADULT - NSHPLABSRESULTS_GEN_ALL_CORE
9.1    7.61  )-----------( 189      ( 24 Mar 2020 09:36 )             28.8     03-24    138  |  105  |  36<H>  ----------------------------<  107<H>  3.2<L>   |  17  |  3.6<H>    Ca    8.5      24 Mar 2020 09:36    CXR   neg infilterate

## 2020-03-24 NOTE — H&P ADULT - HISTORY OF PRESENT ILLNESS
75 yo m with h/o heart transplant, HTN, DM2, CKD, HLD, COPD, hypothyroidism presented to ER for evaluation of fever and cough of 2 days duration. he took tylenol as needed for fever and did not seek medical help until today when he developed chills with worsening condition. He denied any sick contact and lives with his wife. Denied chest pain, palpitations but has mild SOB.   In ER, his heart transplant doc was contacted and his recommendations noted

## 2020-03-24 NOTE — H&P ADULT - NSHPPHYSICALEXAM_GEN_ALL_CORE
ICU Vital Signs Last 24 Hrs  T(C): 37.7 (24 Mar 2020 13:13), Max: 39.3 (24 Mar 2020 07:33)  T(F): 99.8 (24 Mar 2020 13:13), Max: 102.7 (24 Mar 2020 07:33)  HR: 120 (24 Mar 2020 13:13) (100 - 121)  BP: 180/80 (24 Mar 2020 13:13) (123/62 - 180/80)  BP(mean): --  ABP: --  ABP(mean): --  RR: 20 (24 Mar 2020 13:13) (20 - 20)  SpO2: 97% (24 Mar 2020 13:13) (95% - 97%)    General: Acutely ill looking male, WN/WD NAD  Neurology: A&Ox3, nonfocal,    Eyes: PERRLA/ EOMI, Gross vision intact  ENT/Neck: Neck supple, trachea midline, No JVD, Gross hearing intact  Respiratory: CTA B/L, No wheezing, rales, rhonchi  CV: RRR, S1S2, no murmurs, rubs or gallops  Abdominal: Soft, NT, ND +BS,   Extremities: No edema, + peripheral pulses  Skin: No Rashes, Hematoma, Ecchymosis

## 2020-03-24 NOTE — ED PROVIDER NOTE - OBJECTIVE STATEMENT
Pt is a 73y/o male with a pmhx of HTN, HLD, CAD, Heart transplant at [place] presents today for eval of fever/cough x 1 day. Pt denies fever, chills, weakness, numbness, n/v/d, CP, SOB.

## 2020-03-24 NOTE — H&P ADULT - NSHPREVIEWOFSYSTEMS_GEN_ALL_CORE
REVIEW OF SYSTEMS:    CONSTITUTIONAL: No weakness, +++fevers and chills  EYES/ENT: No visual changes;  No vertigo or throat pain   NECK: No pain or stiffness  RESPIRATORY: +++ cough, neg wheezing, neg  hemoptysis; ++ hortness of breath  CARDIOVASCULAR: No chest pain or palpitations  GASTROINTESTINAL: No abdominal or epigastric pain. No nausea, vomiting, or hematemesis; No diarrhea or constipation. No melena or hematochezia.  GENITOURINARY: No dysuria, frequency or hematuria  NEUROLOGICAL: No numbness or weakness  SKIN: No itching, rashes

## 2020-03-24 NOTE — ED PROVIDER NOTE - NS ED ROS FT
Eyes:  No visual changes, eye pain or discharge.  ENMT:  No hearing changes, pain, discharge or infections. No neck pain or stiffness.  Cardiac:  No chest pain, SOB or edema. No chest pain with exertion.  Respiratory: + cough  No respiratory distress. No hemoptysis. No history of asthma or RAD.  GI:  No nausea, vomiting, diarrhea or abdominal pain.  :  No dysuria, frequency or burning.  MS:  No myalgia, muscle weakness, joint pain or back pain.  Neuro:  No headache or weakness.  No LOC.  Skin:  No skin rash.   Endocrine: No history of thyroid disease  Except as documented in the HPI,  all other systems are negative.

## 2020-03-24 NOTE — ED PROVIDER NOTE - ATTENDING CONTRIBUTION TO CARE
I personally evaluated the patient. I reviewed the Resident’s or Physician Assistant’s note (as assigned above), and agree with the findings and plan except as documented in my note.     74 male here for evaluation of cough and chills but no fever or weakness.     PSH: cardiac transplant, known to [facility] with good follow up.   PMH: DM, HTN  Social: lives at home, no travel history. No known COVID19 risk    ROS otherwise unremarkable    GEN: male in no distress.   HEENT: non icteric conjunctiva pink. EOMI   CHEST: CTA bilateral. normal work of breathing. no accessory muscle use. audible cough noted.   NECK: normal ROM   CV: pulses intact S1S2  ABD: soft, non rigid, non distended.   EXT: FROM x 4 NVI   NEURO: AAO 3 no focal deficits. Gait memory speech cognition and coordination grossly intact.   SKIN: no pallor no diaphoresis  PSYCH: normal mood and mentation     Impression: URI, heart transplant    Plan: IV labs imaging supportive care and reevaluation

## 2020-03-24 NOTE — H&P ADULT - NSICDXPASTMEDICALHX_GEN_ALL_CORE_FT
PAST MEDICAL HISTORY:  COPD without exacerbation     Diabetes     HTN (hypertension)     Hypothyroidism     Kidney disease

## 2020-03-24 NOTE — H&P ADULT - ASSESSMENT
73 yo male with multiple comorbid condition admitted for       1. fever with associated cough and SOB in patient with comorbid condition r/o COVID 19/ elevated troponin suspect demand ischemia r/o AMI/ Elevated BNP r/o CHF  - Contact/Droplet/airborne isolation  - telemetry monitoring  -Follow COVID result  -Check cultures  -ID consult  -Cardiology consult  -Cycle troponin, echocardiogram  -C/w IV antibiotics  - Tylenol prn    2. Heart transplant/HLD/Hypothyroidism/DM2  - Called and discussed home medications with wife  - Home medications resumed  - Wife will bring in everolimus and Pantophyilline both non formulary and patient may take own medications. D/w pharmacist  - GOC d/w wife. Patient has no living will and is full code  See GOC note  Monitor labs and vitals    3. CKD 4  -Monitor  -Nephrology consulted due to patient's comorbidity

## 2020-03-25 LAB
ALBUMIN SERPL ELPH-MCNC: 3.5 G/DL — SIGNIFICANT CHANGE UP (ref 3.5–5.2)
ALP SERPL-CCNC: 25 U/L — LOW (ref 30–115)
ALT FLD-CCNC: 21 U/L — SIGNIFICANT CHANGE UP (ref 0–41)
ANION GAP SERPL CALC-SCNC: 16 MMOL/L — HIGH (ref 7–14)
AST SERPL-CCNC: 26 U/L — SIGNIFICANT CHANGE UP (ref 0–41)
BILIRUB SERPL-MCNC: <0.2 MG/DL — SIGNIFICANT CHANGE UP (ref 0.2–1.2)
BUN SERPL-MCNC: 40 MG/DL — HIGH (ref 10–20)
CALCIUM SERPL-MCNC: 8.1 MG/DL — LOW (ref 8.5–10.1)
CHLORIDE SERPL-SCNC: 104 MMOL/L — SIGNIFICANT CHANGE UP (ref 98–110)
CO2 SERPL-SCNC: 16 MMOL/L — LOW (ref 17–32)
CREAT SERPL-MCNC: 3.6 MG/DL — HIGH (ref 0.7–1.5)
ESTIMATED AVERAGE GLUCOSE: 157 MG/DL — HIGH (ref 68–114)
GLUCOSE BLDC GLUCOMTR-MCNC: 100 MG/DL — HIGH (ref 70–99)
GLUCOSE BLDC GLUCOMTR-MCNC: 163 MG/DL — HIGH (ref 70–99)
GLUCOSE BLDC GLUCOMTR-MCNC: 61 MG/DL — LOW (ref 70–99)
GLUCOSE BLDC GLUCOMTR-MCNC: 67 MG/DL — LOW (ref 70–99)
GLUCOSE BLDC GLUCOMTR-MCNC: 83 MG/DL — SIGNIFICANT CHANGE UP (ref 70–99)
GLUCOSE SERPL-MCNC: 66 MG/DL — LOW (ref 70–99)
HBA1C BLD-MCNC: 7.1 % — HIGH (ref 4–5.6)
HCT VFR BLD CALC: 29.3 % — LOW (ref 42–52)
HGB BLD-MCNC: 9.1 G/DL — LOW (ref 14–18)
MCHC RBC-ENTMCNC: 28.7 PG — SIGNIFICANT CHANGE UP (ref 27–31)
MCHC RBC-ENTMCNC: 31.1 G/DL — LOW (ref 32–37)
MCV RBC AUTO: 92.4 FL — SIGNIFICANT CHANGE UP (ref 80–94)
NRBC # BLD: 0 /100 WBCS — SIGNIFICANT CHANGE UP (ref 0–0)
PLATELET # BLD AUTO: 174 K/UL — SIGNIFICANT CHANGE UP (ref 130–400)
POTASSIUM SERPL-MCNC: 3.9 MMOL/L — SIGNIFICANT CHANGE UP (ref 3.5–5)
POTASSIUM SERPL-SCNC: 3.9 MMOL/L — SIGNIFICANT CHANGE UP (ref 3.5–5)
PROT SERPL-MCNC: 5.6 G/DL — LOW (ref 6–8)
RBC # BLD: 3.17 M/UL — LOW (ref 4.7–6.1)
RBC # FLD: 14.1 % — SIGNIFICANT CHANGE UP (ref 11.5–14.5)
SARS-COV-2 RNA SPEC QL NAA+PROBE: SIGNIFICANT CHANGE UP
SODIUM SERPL-SCNC: 136 MMOL/L — SIGNIFICANT CHANGE UP (ref 135–146)
TROPONIN T SERPL-MCNC: 0.09 NG/ML — CRITICAL HIGH
WBC # BLD: 7.37 K/UL — SIGNIFICANT CHANGE UP (ref 4.8–10.8)
WBC # FLD AUTO: 7.37 K/UL — SIGNIFICANT CHANGE UP (ref 4.8–10.8)

## 2020-03-25 PROCEDURE — 99233 SBSQ HOSP IP/OBS HIGH 50: CPT

## 2020-03-25 RX ORDER — EVEROLIMUS 10 MG/1
1 TABLET ORAL
Refills: 0 | Status: DISCONTINUED | OUTPATIENT
Start: 2020-03-25 | End: 2020-03-26

## 2020-03-25 RX ADMIN — AMLODIPINE BESYLATE 10 MILLIGRAM(S): 2.5 TABLET ORAL at 05:11

## 2020-03-25 RX ADMIN — Medication 25 MICROGRAM(S): at 05:10

## 2020-03-25 RX ADMIN — AZITHROMYCIN 255 MILLIGRAM(S): 500 TABLET, FILM COATED ORAL at 13:42

## 2020-03-25 RX ADMIN — Medication 400 MILLIGRAM(S): at 21:13

## 2020-03-25 RX ADMIN — CEFTRIAXONE 100 MILLIGRAM(S): 500 INJECTION, POWDER, FOR SOLUTION INTRAMUSCULAR; INTRAVENOUS at 13:42

## 2020-03-25 RX ADMIN — Medication 1: at 17:37

## 2020-03-25 RX ADMIN — MYCOPHENOLATE MOFETIL 1000 MILLIGRAM(S): 250 CAPSULE ORAL at 17:38

## 2020-03-25 RX ADMIN — ATORVASTATIN CALCIUM 40 MILLIGRAM(S): 80 TABLET, FILM COATED ORAL at 21:13

## 2020-03-25 RX ADMIN — SPIRONOLACTONE 50 MILLIGRAM(S): 25 TABLET, FILM COATED ORAL at 05:10

## 2020-03-25 RX ADMIN — Medication 2 MILLIGRAM(S): at 05:10

## 2020-03-25 RX ADMIN — Medication 650 MILLIGRAM(S): at 00:30

## 2020-03-25 RX ADMIN — MYCOPHENOLATE MOFETIL 1000 MILLIGRAM(S): 250 CAPSULE ORAL at 05:10

## 2020-03-25 RX ADMIN — Medication 48 MILLIGRAM(S): at 13:42

## 2020-03-25 RX ADMIN — EVEROLIMUS 1 MILLIGRAM(S): 10 TABLET ORAL at 05:11

## 2020-03-25 RX ADMIN — Medication 650 MILLIGRAM(S): at 20:35

## 2020-03-25 RX ADMIN — EVEROLIMUS 1 MILLIGRAM(S): 10 TABLET ORAL at 17:38

## 2020-03-25 RX ADMIN — Medication 650 MILLIGRAM(S): at 12:29

## 2020-03-25 RX ADMIN — Medication 800 MILLIGRAM(S): at 12:17

## 2020-03-25 RX ADMIN — Medication 650 MILLIGRAM(S): at 19:44

## 2020-03-25 RX ADMIN — Medication 2 MILLIGRAM(S): at 17:38

## 2020-03-25 RX ADMIN — Medication 30 MILLILITER(S): at 17:43

## 2020-03-25 RX ADMIN — Medication 650 MILLIGRAM(S): at 13:11

## 2020-03-25 NOTE — PROGRESS NOTE ADULT - ASSESSMENT
75 yo male with multiple comorbid condition admitted for     1. fever with associated cough and SOB in patient with comorbid condition r/o COVID 19/ elevated troponin suspect demand ischemia  - Contact/Droplet/airborne isolation  - Will DC tele monitoring  - Cough and dyspnea resolved today, pt comfortable on RA O2 96%  -Follow COVID result  -Check cultures  -ID consult  - f/u echo    2. Heart transplant/HLD/Hypothyroidism/DM2  - Called and discussed home medications with wife  - Home medications resumed  - Wife will bring in everolimus and Pantophyilline both non formulary and patient may take own medications. D/w pharmacist  - Will discuss with pt's transplant doctors at [facility] d/w wife. Patient has no living will and is full code    3. CKD 4  -Monitor  -Nephrology consulted due to patient's comorbidity    GI/DVT PPX    #Progress Note Handoff  Pending (specify): COVID results, resolution of diarrhea  Family discussion: d/w pt regarding pending test results  Disposition: Home

## 2020-03-25 NOTE — CONSULT NOTE ADULT - SUBJECTIVE AND OBJECTIVE BOX
Patient is a 74y old  Male who presents with a chief complaint of fever and cough (25 Mar 2020 08:15)      INTERVAL HPI/OVERNIGHT EVENTS:        PAST MEDICAL & SURGICAL HISTORY:  COPD without exacerbation  Kidney disease  Hypothyroidism  Diabetes  HTN (hypertension)  H/O heart transplant      REVIEW OF SYSTEMS: Total of twelve systems have been reviewed with patient and found to be negative unless mentioned in HPI      SOCIAL HISTORY  Alcohol: Does not drink  Tobacco: Does not smoke  Illicit substance use: None      FAMILY HISTORY: Non contributory to the present illness        No Known Allergies      Vital Signs Last 24 Hrs  T(C): 37.6 (25 Mar 2020 08:47), Max: 40.1 (24 Mar 2020 20:30)  T(F): 99.7 (25 Mar 2020 08:47), Max: 104.2 (24 Mar 2020 20:30)  HR: 100 (25 Mar 2020 08:47) (99 - 138)  BP: 152/84 (25 Mar 2020 04:30) (123/65 - 186/77)  BP(mean): --  RR: 17 (25 Mar 2020 08:47) (17 - 22)  SpO2: 98% (25 Mar 2020 08:47) (96% - 98%)      PHYSICAL EXAM:  GENERAL: Not in distress   CHEST/LUNG:  Aire ntry bilaterally  HEART: s1 and s2 present  ABDOMEN:  Nontender and  Nondistended  EXTREMITIES: No pedal  edema  CNS: Awake and Alert      LABS:                9.1    7.37  )-----------( 174      ( 25 Mar 2020 06:49 )             29.3       03-25    136  |  104  |  40<H>  ----------------------------<  66<L>  3.9   |  16<L>  |  3.6<H>    Ca    8.1<L>      25 Mar 2020 06:49    TPro  5.6<L>  /  Alb  3.5  /  TBili  <0.2  /  DBili  x   /  AST  26  /  ALT  21  /  AlkPhos  25<L>  03-25        CAPILLARY BLOOD GLUCOSE      POCT Blood Glucose.: 67 mg/dL (25 Mar 2020 07:59)  POCT Blood Glucose.: 127 mg/dL (24 Mar 2020 20:24)  POCT Blood Glucose.: 135 mg/dL (24 Mar 2020 16:40)            MEDICATIONS  (STANDING):  albuterol/ipratropium for Nebulization 3 milliLiter(s) Nebulizer every 6 hours  amLODIPine   Tablet 10 milliGRAM(s) Oral daily  atorvastatin 40 milliGRAM(s) Oral at bedtime  azithromycin  IVPB 500 milliGRAM(s) IV Intermittent every 24 hours  cefTRIAXone   IVPB 1000 milliGRAM(s) IV Intermittent every 24 hours  dextrose 5%. 1000 milliLiter(s) (50 mL/Hr) IV Continuous <Continuous>  dextrose 50% Injectable 12.5 Gram(s) IV Push once  dextrose 50% Injectable 25 Gram(s) IV Push once  dextrose 50% Injectable 25 Gram(s) IV Push once  everolimus (ZORTRESS) 1 milliGRAM(s) Oral two times a day  fenofibrate Tablet 48 milliGRAM(s) Oral daily  influenza   Vaccine 0.5 milliLiter(s) IntraMuscular once  insulin glargine Injectable (LANTUS) 10 Unit(s) SubCutaneous at bedtime  insulin lispro (HumaLOG) corrective regimen sliding scale   SubCutaneous three times a day before meals  levothyroxine 25 MICROGram(s) Oral daily  mycophenolate mofetil 1000 milliGRAM(s) Oral two times a day  pentoxifylline 400 milliGRAM(s) Oral at bedtime  pentoxifylline 800 milliGRAM(s) Oral daily  predniSONE   Tablet 2 milliGRAM(s) Oral two times a day  spironolactone 50 milliGRAM(s) Oral daily    MEDICATIONS  (PRN):  acetaminophen   Tablet .. 650 milliGRAM(s) Oral every 6 hours PRN Temp greater or equal to 38C (100.4F)  dextrose 40% Gel 15 Gram(s) Oral once PRN Blood Glucose LESS THAN 70 milliGRAM(s)/deciliter  glucagon  Injectable 1 milliGRAM(s) IntraMuscular once PRN Glucose LESS THAN 70 milligrams/deciliter          RADIOLOGY & ADDITIONAL TESTS: Patient is a 74y old  Male with h/o heart transplant, HTN, DM2, CKD, HLD, COPD, hypothyroidism presented to ER for evaluation of fever and cough of 2 days. He took tylenol as needed for fever and did not seek medical help until the day of admission, 3/24/20,  when he developed chills with worsening condition. He denied any sick contact and lives with his wife at home. Denied chest pain, palpitations but has mild SOB.  In ER, he found to have fever, tachycardia but not hypoxic . The CXR shows no consolidation. He has started on Ceftriaxone and Azithromycin, and the ID consult requested to assist with further evaluation and antibiotic management.         REVIEW OF SYSTEMS: Total of twelve systems have been reviewed with patient and found to be negative unless mentioned in HPI        PAST MEDICAL & SURGICAL HISTORY:  COPD without exacerbation  Kidney disease  Hypothyroidism  Diabetes  HTN (hypertension)  H/O heart transplant        SOCIAL HISTORY  Alcohol: Does not drink  Tobacco: Does not smoke  Illicit substance use: None      FAMILY HISTORY: Non contributory to the present illness        ALLERGIES: No Known Allergies        Vital Signs Last 24 Hrs  T(C): 37.6 (25 Mar 2020 08:47), Max: 40.1 (24 Mar 2020 20:30)  T(F): 99.7 (25 Mar 2020 08:47), Max: 104.2 (24 Mar 2020 20:30)  HR: 100 (25 Mar 2020 08:47) (99 - 138)  BP: 152/84 (25 Mar 2020 04:30) (123/65 - 186/77)  BP(mean): --  RR: 17 (25 Mar 2020 08:47) (17 - 22)  SpO2: 98% (25 Mar 2020 08:47) (96% - 98%)        PHYSICAL EXAM:  GENERAL: Not in distress   CHEST/LUNG:  Air entry bilaterally  HEART: s1 and s2 present  ABDOMEN:  Nontender and  Nondistended  EXTREMITIES: No pedal  edema  CNS: Awake and Alert        LABS:                9.1    7.37  )-----------( 174      ( 25 Mar 2020 06:49 )             29.3       03-25    136  |  104  |  40<H>  ----------------------------<  66<L>  3.9   |  16<L>  |  3.6<H>    Ca    8.1<L>      25 Mar 2020 06:49    TPro  5.6<L>  /  Alb  3.5  /  TBili  <0.2  /  DBili  x   /  AST  26  /  ALT  21  /  AlkPhos  25<L>  03-25        CAPILLARY BLOOD GLUCOSE  POCT Blood Glucose.: 67 mg/dL (25 Mar 2020 07:59)  POCT Blood Glucose.: 127 mg/dL (24 Mar 2020 20:24)  POCT Blood Glucose.: 135 mg/dL (24 Mar 2020 16:40)          MEDICATIONS  (STANDING):  albuterol/ipratropium for Nebulization 3 milliLiter(s) Nebulizer every 6 hours  amLODIPine   Tablet 10 milliGRAM(s) Oral daily  atorvastatin 40 milliGRAM(s) Oral at bedtime  azithromycin  IVPB 500 milliGRAM(s) IV Intermittent every 24 hours  cefTRIAXone   IVPB 1000 milliGRAM(s) IV Intermittent every 24 hours  everolimus (ZORTRESS) 1 milliGRAM(s) Oral two times a day  fenofibrate Tablet 48 milliGRAM(s) Oral daily  influenza   Vaccine 0.5 milliLiter(s) IntraMuscular once  insulin glargine Injectable (LANTUS) 10 Unit(s) SubCutaneous at bedtime  insulin lispro (HumaLOG) corrective regimen sliding scale   SubCutaneous three times a day before meals  levothyroxine 25 MICROGram(s) Oral daily  mycophenolate mofetil 1000 milliGRAM(s) Oral two times a day  pentoxifylline 400 milliGRAM(s) Oral at bedtime  pentoxifylline 800 milliGRAM(s) Oral daily  predniSONE   Tablet 2 milliGRAM(s) Oral two times a day  spironolactone 50 milliGRAM(s) Oral daily    MEDICATIONS  (PRN):  acetaminophen   Tablet .. 650 milliGRAM(s) Oral every 6 hours PRN Temp greater or equal to 38C (100.4F)  dextrose 40% Gel 15 Gram(s) Oral once PRN Blood Glucose LESS THAN 70 milliGRAM(s)/deciliter  glucagon  Injectable 1 milliGRAM(s) IntraMuscular once PRN Glucose LESS THAN 70 milligrams/deciliter          RADIOLOGY & ADDITIONAL TESTS:    < from: Xray Chest 1 View- PORTABLE-Urgent (03.24.20 @ 08:31) >  No consolidation effusion or pneumothorax.    < end of copied text >

## 2020-03-25 NOTE — CONSULT NOTE ADULT - SUBJECTIVE AND OBJECTIVE BOX
CARDIOLOGY CONSULT NOTE     CHIEF COMPLAINT/REASON FOR CONSULT:    HPI:  73 yo m with h/o heart transplant, HTN, DM2, CKD, HLD, COPD, hypothyroidism presented to ER for evaluation of fever and cough of 2 days duration. he took tylenol as needed for fever and did not seek medical help until today when he developed chills with worsening condition. He denied any sick contact and lives with his wife. Denied chest pain, palpitations but has mild SOB.   In ER, his heart transplant doc was contacted and his recommendations noted (24 Mar 2020 14:30)      PAST MEDICAL & SURGICAL HISTORY:  COPD without exacerbation  Kidney disease  Hypothyroidism  Diabetes  HTN (hypertension)  H/O heart transplant      Cardiac Risks:   [x ]HTN, [ x] DM, [ ] Smoking, [ ] FH,  [ ] Lipids        MEDICATIONS:  MEDICATIONS  (STANDING):  albuterol/ipratropium for Nebulization 3 milliLiter(s) Nebulizer every 6 hours  amLODIPine   Tablet 10 milliGRAM(s) Oral daily  atorvastatin 40 milliGRAM(s) Oral at bedtime  azithromycin  IVPB 500 milliGRAM(s) IV Intermittent every 24 hours  cefTRIAXone   IVPB 1000 milliGRAM(s) IV Intermittent every 24 hours  dextrose 5%. 1000 milliLiter(s) (50 mL/Hr) IV Continuous <Continuous>  dextrose 50% Injectable 12.5 Gram(s) IV Push once  dextrose 50% Injectable 25 Gram(s) IV Push once  dextrose 50% Injectable 25 Gram(s) IV Push once  everolimus (ZORTRESS) 1 milliGRAM(s) Oral two times a day  fenofibrate Tablet 48 milliGRAM(s) Oral daily  influenza   Vaccine 0.5 milliLiter(s) IntraMuscular once  insulin glargine Injectable (LANTUS) 10 Unit(s) SubCutaneous at bedtime  insulin lispro (HumaLOG) corrective regimen sliding scale   SubCutaneous three times a day before meals  levothyroxine 25 MICROGram(s) Oral daily  mycophenolate mofetil 1000 milliGRAM(s) Oral two times a day  pentoxifylline 400 milliGRAM(s) Oral at bedtime  pentoxifylline 800 milliGRAM(s) Oral daily  predniSONE   Tablet 2 milliGRAM(s) Oral two times a day  spironolactone 50 milliGRAM(s) Oral daily      FAMILY HISTORY:      SOCIAL HISTORY:      [ ] Marital status   Allergies    No Known Allergies    Intolerances    	    REVIEW OF SYSTEMS:  CONSTITUTIONAL: No fever, weight loss, or fatigue  EYES: No eye pain, visual disturbances, or discharge  ENMT:  No difficulty hearing, tinnitus, vertigo; No sinus or throat pain  NECK: No pain or stiffness  RESPIRATORY: No cough, wheezing, chills or hemoptysis; No Shortness of Breath  CARDIOVASCULAR: See above  GASTROINTESTINAL: No abdominal or epigastric pain. No nausea, vomiting, or hematemesis; No diarrhea or constipation. No melena or hematochezia.  GENITOURINARY: No dysuria, frequency, hematuria, or incontinence  NEUROLOGICAL: No headaches, memory loss, loss of strength, numbness, or tremors  SKIN: No itching, burning, rashes, or lesions   	    [ ] All others negative	  [ ] Unable to obtain    PHYSICAL EXAM:  T(C): 38.6 (03-25-20 @ 06:56), Max: 40.1 (03-24-20 @ 20:30)  HR: 112 (03-25-20 @ 04:30) (99 - 138)  BP: 152/84 (03-25-20 @ 04:30) (123/65 - 186/77)  RR: 18 (03-25-20 @ 04:30) (18 - 22)  SpO2: 98% (03-24-20 @ 22:00) (96% - 98%)  Wt(kg): --  I&O's Summary    24 Mar 2020 07:01  -  25 Mar 2020 07:00  --------------------------------------------------------  IN: 0 mL / OUT: 600 mL / NET: -600 mL        Appearance: Normal	  Psychiatry: A & O x 3, Mood & affect appropriate  HEENT:   Normal oral mucosa, PERRL, EOMI	  Lymphatic: No lymphadenopathy  Cardiovascular: Normal S1 S2,RRR, No JVD, No murmurs  Respiratory: Lungs clear to auscultation	  Gastrointestinal:  Soft, Non-tender, + BS	  Skin: No rashes, No ecchymoses, No cyanosis	  Neurologic: Non-focal  Extremities: Normal range of motion, No clubbing, cyanosis or edema  Vascular: Peripheral pulses palpable 2+ bilaterally      ECG:  	  < from: 12 Lead ECG (03.24.20 @ 10:54) >    Diagnosis Line Sinus tachycardia  Incomplete right bundle branch block  Anterior infarct , age undetermined      Confirmed by HARMAN DELGADO MD ([id number]) on 3/24/2020 11:36:57 AM    < end of copied text >    	  LABS:	 	    CARDIAC MARKERS:          Serum Pro-Brain Natriuretic Peptide: 401 pg/mL (03-24 @ 09:36)                            9.1    7.37  )-----------( 174      ( 25 Mar 2020 06:49 )             29.3     03-24    138  |  105  |  36<H>  ----------------------------<  107<H>  3.2<L>   |  17  |  3.6<H>    Ca    8.5      24 Mar 2020 09:36        proBNP: Serum Pro-Brain Natriuretic Peptide: 401 pg/mL (03-24 @ 09:36)

## 2020-03-26 ENCOUNTER — TRANSCRIPTION ENCOUNTER (OUTPATIENT)
Age: 75
End: 2020-03-26

## 2020-03-26 VITALS — TEMPERATURE: 100 F

## 2020-03-26 DIAGNOSIS — N28.9 DISORDER OF KIDNEY AND URETER, UNSPECIFIED: ICD-10-CM

## 2020-03-26 DIAGNOSIS — B34.2 CORONAVIRUS INFECTION, UNSPECIFIED: ICD-10-CM

## 2020-03-26 LAB
ANION GAP SERPL CALC-SCNC: 17 MMOL/L — HIGH (ref 7–14)
BUN SERPL-MCNC: 52 MG/DL — HIGH (ref 10–20)
C DIFF BY PCR RESULT: NEGATIVE — SIGNIFICANT CHANGE UP
C DIFF TOX GENS STL QL NAA+PROBE: SIGNIFICANT CHANGE UP
CALCIUM SERPL-MCNC: 8.1 MG/DL — LOW (ref 8.5–10.1)
CHLORIDE SERPL-SCNC: 102 MMOL/L — SIGNIFICANT CHANGE UP (ref 98–110)
CO2 SERPL-SCNC: 16 MMOL/L — LOW (ref 17–32)
CREAT SERPL-MCNC: 4.1 MG/DL — CRITICAL HIGH (ref 0.7–1.5)
GLUCOSE BLDC GLUCOMTR-MCNC: 91 MG/DL — SIGNIFICANT CHANGE UP (ref 70–99)
GLUCOSE SERPL-MCNC: 111 MG/DL — HIGH (ref 70–99)
HCT VFR BLD CALC: 36.2 % — LOW (ref 42–52)
HGB BLD-MCNC: 11.3 G/DL — LOW (ref 14–18)
MCHC RBC-ENTMCNC: 28.6 PG — SIGNIFICANT CHANGE UP (ref 27–31)
MCHC RBC-ENTMCNC: 31.2 G/DL — LOW (ref 32–37)
MCV RBC AUTO: 91.6 FL — SIGNIFICANT CHANGE UP (ref 80–94)
NRBC # BLD: 0 /100 WBCS — SIGNIFICANT CHANGE UP (ref 0–0)
PLATELET # BLD AUTO: 173 K/UL — SIGNIFICANT CHANGE UP (ref 130–400)
POTASSIUM SERPL-MCNC: 4.2 MMOL/L — SIGNIFICANT CHANGE UP (ref 3.5–5)
POTASSIUM SERPL-SCNC: 4.2 MMOL/L — SIGNIFICANT CHANGE UP (ref 3.5–5)
RBC # BLD: 3.95 M/UL — LOW (ref 4.7–6.1)
RBC # FLD: 13.7 % — SIGNIFICANT CHANGE UP (ref 11.5–14.5)
SODIUM SERPL-SCNC: 135 MMOL/L — SIGNIFICANT CHANGE UP (ref 135–146)
WBC # BLD: 8.38 K/UL — SIGNIFICANT CHANGE UP (ref 4.8–10.8)
WBC # FLD AUTO: 8.38 K/UL — SIGNIFICANT CHANGE UP (ref 4.8–10.8)

## 2020-03-26 RX ORDER — AMLODIPINE BESYLATE 2.5 MG/1
1 TABLET ORAL
Qty: 0 | Refills: 0 | DISCHARGE
Start: 2020-03-26

## 2020-03-26 RX ORDER — PENTOXIFYLLINE 400 MG
1 TABLET, EXTENDED RELEASE ORAL
Qty: 0 | Refills: 0 | DISCHARGE
Start: 2020-03-26

## 2020-03-26 RX ORDER — MYCOPHENOLATE MOFETIL 250 MG/1
2 CAPSULE ORAL
Qty: 0 | Refills: 0 | DISCHARGE

## 2020-03-26 RX ORDER — LEVOTHYROXINE SODIUM 125 MCG
0 TABLET ORAL
Qty: 0 | Refills: 0 | DISCHARGE

## 2020-03-26 RX ORDER — PENTOXIFYLLINE 400 MG
2 TABLET, EXTENDED RELEASE ORAL
Qty: 0 | Refills: 0 | DISCHARGE
Start: 2020-03-26

## 2020-03-26 RX ORDER — FENOFIBRATE,MICRONIZED 130 MG
1 CAPSULE ORAL
Qty: 0 | Refills: 0 | DISCHARGE
Start: 2020-03-26

## 2020-03-26 RX ORDER — EVEROLIMUS 10 MG/1
1 TABLET ORAL
Qty: 0 | Refills: 0 | DISCHARGE

## 2020-03-26 RX ORDER — EVEROLIMUS 10 MG/1
0 TABLET ORAL
Qty: 0 | Refills: 0 | DISCHARGE

## 2020-03-26 RX ORDER — LEVOTHYROXINE SODIUM 125 MCG
1 TABLET ORAL
Qty: 0 | Refills: 0 | DISCHARGE

## 2020-03-26 RX ORDER — MYCOPHENOLATE MOFETIL 250 MG/1
0 CAPSULE ORAL
Qty: 0 | Refills: 0 | DISCHARGE

## 2020-03-26 RX ORDER — SODIUM CHLORIDE 9 MG/ML
1000 INJECTION INTRAMUSCULAR; INTRAVENOUS; SUBCUTANEOUS
Refills: 0 | Status: COMPLETED | OUTPATIENT
Start: 2020-03-26 | End: 2020-03-26

## 2020-03-26 RX ORDER — SPIRONOLACTONE 25 MG/1
2 TABLET, FILM COATED ORAL
Qty: 0 | Refills: 0 | DISCHARGE
Start: 2020-03-26

## 2020-03-26 RX ORDER — SODIUM CHLORIDE 9 MG/ML
1000 INJECTION INTRAMUSCULAR; INTRAVENOUS; SUBCUTANEOUS
Refills: 0 | Status: DISCONTINUED | OUTPATIENT
Start: 2020-03-26 | End: 2020-03-26

## 2020-03-26 RX ORDER — ATORVASTATIN CALCIUM 80 MG/1
1 TABLET, FILM COATED ORAL
Qty: 0 | Refills: 0 | DISCHARGE
Start: 2020-03-26

## 2020-03-26 RX ADMIN — Medication 2 MILLIGRAM(S): at 05:14

## 2020-03-26 RX ADMIN — MYCOPHENOLATE MOFETIL 1000 MILLIGRAM(S): 250 CAPSULE ORAL at 05:14

## 2020-03-26 RX ADMIN — EVEROLIMUS 1 MILLIGRAM(S): 10 TABLET ORAL at 05:15

## 2020-03-26 RX ADMIN — SODIUM CHLORIDE 500 MILLILITER(S): 9 INJECTION INTRAMUSCULAR; INTRAVENOUS; SUBCUTANEOUS at 12:15

## 2020-03-26 RX ADMIN — SPIRONOLACTONE 50 MILLIGRAM(S): 25 TABLET, FILM COATED ORAL at 05:14

## 2020-03-26 RX ADMIN — Medication 650 MILLIGRAM(S): at 11:54

## 2020-03-26 RX ADMIN — Medication 650 MILLIGRAM(S): at 12:59

## 2020-03-26 RX ADMIN — Medication 48 MILLIGRAM(S): at 11:46

## 2020-03-26 RX ADMIN — Medication 25 MICROGRAM(S): at 05:14

## 2020-03-26 RX ADMIN — Medication 800 MILLIGRAM(S): at 11:46

## 2020-03-26 RX ADMIN — AMLODIPINE BESYLATE 10 MILLIGRAM(S): 2.5 TABLET ORAL at 05:15

## 2020-03-26 NOTE — PROGRESS NOTE ADULT - ASSESSMENT
73 yo male with multiple comorbid condition admitted for     1. COVID + viral gastroenteritis/ elevated troponin suspect demand ischemia  - Contact/Droplet/airborne isolation  - Will DC tele monitoring  - Cough and dyspnea resolved, pt comfortable on RA O2 96%. Diarrhea improving  - Per ID, pt can be DC self-isolate at home for 14 days. Advised patient to stay well-hydrated and encouraged PO intake as tolerated. Informed patient that fevers will recur as his body clears out viral infection. He should take tylenol as needed at home.  - CMV and GI PCR pending, will contact patient's transplant cardiologist once results available    2. Heart transplant/HLD/Hypothyroidism/DM2  - Called and discussed home medications with wife  - Home medications resumed  - Wife will bring in everolimus and Pantophyilline both non formulary and patient may take own medications. D/w pharmacist  - Will discuss with pt's transplant doctors at [facility] d/w wife. Patient has no living will and is full code    3. JUAN JOSE on CKD 4, likely pre-renal, dehydration  -Monitor  -Nephrology consult appreciated, no further renal workup indicated. Patient's outpatient nephrologist, Dr. Snider, was contacted and is aware of his clinical status. Pt will follow up with him outpatient.    GI/DVT PPX    #Progress Note Handoff  Pending (specify): DC planning  Family discussion: d/w pt regarding discharge today  Disposition: Home

## 2020-03-26 NOTE — DISCHARGE NOTE PROVIDER - HOSPITAL COURSE
75 yo m with h/o heart transplant, HTN, DM2, CKD, HLD, COPD, hypothyroidism presented to ER for evaluation of fever and cough of 2 days duration. he took tylenol as needed for fever and did not seek medical help until today when he developed chills with worsening condition. He denied any sick contact and lives with his wife. Denied chest pain, palpitations but has mild SOB.     In ER, his heart transplant doc was contacted and his recommendations noted        Patient had diarrhea and fever during admission. Found to be COVID +. Patient was evaluated by infectious disease specialist who cleared pt for discharge. Patient was also evaluated by nephrology due to chronic kidney disease. Patient medically stable to be discharged home with self-isolation for 14 days.

## 2020-03-26 NOTE — CONSULT NOTE ADULT - SUBJECTIVE AND OBJECTIVE BOX
NEPHROLOGY CONSULTATION NOTE    THIS CONSULT IS INCOMPLETE / FULL CONSULT TO FOLLOW    Patient is a 74y Male whom presented to the hospital with     PAST MEDICAL & SURGICAL HISTORY:  COPD without exacerbation  Kidney disease  Hypothyroidism  Diabetes  HTN (hypertension)  H/O heart transplant    Allergies:  No Known Allergies    Home Medications Reviewed  Hospital Medications:   MEDICATIONS  (STANDING):  albuterol/ipratropium for Nebulization 3 milliLiter(s) Nebulizer every 6 hours  amLODIPine   Tablet 10 milliGRAM(s) Oral daily  atorvastatin 40 milliGRAM(s) Oral at bedtime  azithromycin  IVPB 500 milliGRAM(s) IV Intermittent every 24 hours  cefTRIAXone   IVPB 1000 milliGRAM(s) IV Intermittent every 24 hours  dextrose 5%. 1000 milliLiter(s) (50 mL/Hr) IV Continuous <Continuous>  dextrose 50% Injectable 12.5 Gram(s) IV Push once  dextrose 50% Injectable 25 Gram(s) IV Push once  dextrose 50% Injectable 25 Gram(s) IV Push once  everolimus (ZORTRESS) 1 milliGRAM(s) Oral two times a day  fenofibrate Tablet 48 milliGRAM(s) Oral daily  influenza   Vaccine 0.5 milliLiter(s) IntraMuscular once  insulin glargine Injectable (LANTUS) 10 Unit(s) SubCutaneous at bedtime  insulin lispro (HumaLOG) corrective regimen sliding scale   SubCutaneous three times a day before meals  levothyroxine 25 MICROGram(s) Oral daily  mycophenolate mofetil 1000 milliGRAM(s) Oral two times a day  pentoxifylline 400 milliGRAM(s) Oral at bedtime  pentoxifylline 800 milliGRAM(s) Oral daily  predniSONE   Tablet 2 milliGRAM(s) Oral two times a day  spironolactone 50 milliGRAM(s) Oral daily      SOCIAL HISTORY:  Denies ETOH,Smoking,   FAMILY HISTORY:        REVIEW OF SYSTEMS:  CONSTITUTIONAL: No weakness, fevers or chills  EYES/ENT: No visual changes;  No vertigo or throat pain   NECK: No pain or stiffness  RESPIRATORY: No cough, wheezing, hemoptysis; No shortness of breath  CARDIOVASCULAR: No chest pain or palpitations.  GASTROINTESTINAL: No abdominal or epigastric pain. No nausea, vomiting, or hematemesis; No diarrhea or constipation. No melena or hematochezia.  GENITOURINARY: No dysuria, frequency, foamy urine, urinary urgency, incontinence or hematuria  NEUROLOGICAL: No numbness or weakness  SKIN: No itching, burning, rashes, or lesions   VASCULAR: No bilateral lower extremity edema.   All other review of systems is negative unless indicated above.    VITALS:  T(F): 98.7 (03-26-20 @ 05:37), Max: 102.3 (03-25-20 @ 21:00)  HR: 112 (03-26-20 @ 05:37)  BP: 120/56 (03-26-20 @ 05:37)  RR: 18 (03-26-20 @ 05:37)  SpO2: 97% (03-26-20 @ 05:36)    03-24 @ 07:01  -  03-25 @ 07:00  --------------------------------------------------------  IN: 0 mL / OUT: 600 mL / NET: -600 mL    03-25 @ 07:01  -  03-26 @ 06:54  --------------------------------------------------------  IN: 1060 mL / OUT: 4 mL / NET: 1056 mL            I&O's Detail    24 Mar 2020 07:01  -  25 Mar 2020 07:00  --------------------------------------------------------  IN:  Total IN: 0 mL    OUT:    Voided: 600 mL  Total OUT: 600 mL    Total NET: -600 mL      25 Mar 2020 07:01  -  26 Mar 2020 06:54  --------------------------------------------------------  IN:    IV PiggyBack: 300 mL    Oral Fluid: 760 mL  Total IN: 1060 mL    OUT:    Stool: 4 mL  Total OUT: 4 mL    Total NET: 1056 mL            PHYSICAL EXAM:  Constitutional: NAD  HEENT: anicteric sclera, oropharynx clear, MMM  Neck: No JVD  Respiratory: CTAB, no wheezes, rales or rhonchi  Cardiovascular: S1, S2, RRR  Gastrointestinal: BS+, soft, NT/ND  Extremities: No cyanosis or clubbing. No peripheral edema  Neurological: A/O x 3, no focal deficits  Psychiatric: Normal mood, normal affect  : No CVA tenderness. No tipton.   Skin: No rashes  Vascular Access:    LABS:  03-25    136  |  104  |  40<H>  ----------------------------<  66<L>  3.9   |  16<L>  |  3.6<H>    Ca    8.1<L>      25 Mar 2020 06:49    TPro  5.6<L>  /  Alb  3.5  /  TBili  <0.2  /  DBili      /  AST  26  /  ALT  21  /  AlkPhos  25<L>  03-25    Creatinine Trend: 3.6 <--, 3.6 <--                        9.1    7.37  )-----------( 174      ( 25 Mar 2020 06:49 )             29.3     Urine Studies:              RADIOLOGY & ADDITIONAL STUDIES: NEPHROLOGY CONSULTATION NOTE    75 yo m with h/o heart transplant, HTN, DM2, CKD, HLD, COPD, hypothyroidism presenting with a 2 days duration of cough , lethargy.  Patient tested positive for Covid and was admitted to the hospital for monitoring and management .  Seen today still has cough , no fever no chills no abdominal pain no diarrhea no constipation no rash no focal motor sensory deficit       PAST MEDICAL & SURGICAL HISTORY:  COPD without exacerbation  Kidney disease  Hypothyroidism  Diabetes  HTN (hypertension)  H/O heart transplant    Allergies:  No Known Allergies    Home Medications Reviewed  Hospital Medications:   MEDICATIONS  (STANDING):  albuterol/ipratropium for Nebulization 3 milliLiter(s) Nebulizer every 6 hours  amLODIPine   Tablet 10 milliGRAM(s) Oral daily  atorvastatin 40 milliGRAM(s) Oral at bedtime  azithromycin  IVPB 500 milliGRAM(s) IV Intermittent every 24 hours  cefTRIAXone   IVPB 1000 milliGRAM(s) IV Intermittent every 24 hours  dextrose 5%. 1000 milliLiter(s) (50 mL/Hr) IV Continuous <Continuous>  everolimus (ZORTRESS) 1 milliGRAM(s) Oral two times a day  fenofibrate Tablet 48 milliGRAM(s) Oral daily  influenza   Vaccine 0.5 milliLiter(s) IntraMuscular once  insulin glargine Injectable (LANTUS) 10 Unit(s) SubCutaneous at bedtime  insulin lispro (HumaLOG) corrective regimen sliding scale   SubCutaneous three times a day before meals  levothyroxine 25 MICROGram(s) Oral daily  mycophenolate mofetil 1000 milliGRAM(s) Oral two times a day  pentoxifylline 400 milliGRAM(s) Oral at bedtime  pentoxifylline 800 milliGRAM(s) Oral daily  predniSONE   Tablet 2 milliGRAM(s) Oral two times a day  spironolactone 50 milliGRAM(s) Oral daily      SOCIAL HISTORY:  Denies ETOH,Smoking,   FAMILY HISTORY:        REVIEW OF SYSTEMS:  All other review of systems is negative unless indicated above.    VITALS:  T(F): 98.7 (03-26-20 @ 05:37), Max: 102.3 (03-25-20 @ 21:00)  HR: 112 (03-26-20 @ 05:37)  BP: 120/56 (03-26-20 @ 05:37)  RR: 18 (03-26-20 @ 05:37)  SpO2: 97% (03-26-20 @ 05:36)    03-24 @ 07:01  -  03-25 @ 07:00  --------------------------------------------------------  IN: 0 mL / OUT: 600 mL / NET: -600 mL    03-25 @ 07:01  -  03-26 @ 06:54  --------------------------------------------------------  IN: 1060 mL / OUT: 4 mL / NET: 1056 mL            I&O's Detail    24 Mar 2020 07:01  -  25 Mar 2020 07:00  --------------------------------------------------------  IN:  Total IN: 0 mL    OUT:    Voided: 600 mL  Total OUT: 600 mL    Total NET: -600 mL      25 Mar 2020 07:01  -  26 Mar 2020 06:54  --------------------------------------------------------  IN:    IV PiggyBack: 300 mL    Oral Fluid: 760 mL  Total IN: 1060 mL    OUT:    Stool: 4 mL  Total OUT: 4 mL    Total NET: 1056 mL            PHYSICAL EXAM:  Constitutional: coughing   HEENT: anicteric sclera, oropharynx clear, MMM  Neck: No JVD  Respiratory: Crackles at base   Cardiovascular: S1, S2, RRR  Gastrointestinal: BS+, soft, NT/ND  Extremities: No cyanosis or clubbing. No peripheral edema  : No CVA tenderness. No tipton.   Skin: No rashes  Vascular Access:    LABS:  03-25    136  |  104  |  40<H>  ----------------------------<  66<L>  3.9   |  16<L>  |  3.6<H>    Ca    8.1<L>      25 Mar 2020 06:49    TPro  5.6<L>  /  Alb  3.5  /  TBili  <0.2  /  DBili      /  AST  26  /  ALT  21  /  AlkPhos  25<L>  03-25    Creatinine Trend: 3.6 <--, 3.6 <--                        9.1    7.37  )-----------( 174      ( 25 Mar 2020 06:49 )             29.3     COVID-19 PCR . (03.24.20 @ 11:45)    COVID-19 PCR: SARS-CoV-2 POS LDT - Laboratory Developed Test All “detected” results on this new test  are considered presumptively positive results, are clinically actionable,  and specimens will be forwarded to [facility] for confirmation testing.  Another report (corrected report) will only be issued if discordant  results occur.  This test has been validated by [facility] to be accurate;  though it has not been FDA cleared/approved by the usual pathway.  As with all laboratory tests, results should be correlated with clinical  findings.      Urine Studies:              RADIOLOGY & ADDITIONAL STUDIES:    < from: Xray Chest 1 View- PORTABLE-Urgent (03.24.20 @ 08:31) >  Impression:    No consolidation effusion or pneumothorax.    < end of copied text >

## 2020-03-26 NOTE — PROGRESS NOTE ADULT - PROBLEM SELECTOR PLAN 1
positive   High risk pt - cardiac transplant   Neg CXR and NOT hypoxic   Can dc home to self isolate   No HCQ for now   recall if needed

## 2020-03-26 NOTE — DISCHARGE NOTE PROVIDER - NSDCMRMEDTOKEN_GEN_ALL_CORE_FT
amLODIPine 10 mg oral tablet: 1 tab(s) orally once a day  atorvastatin 40 mg oral tablet: 1 tab(s) orally once a day (at bedtime)  CellCept:   everolimus:   fenofibrate 48 mg oral tablet: 1 tab(s) orally once a day  insulin:   levothyroxine:   oxyCODONE-acetaminophen 5 mg-325 mg oral tablet: 1 tab(s) orally every 6 hours MDD:4  pentoxifylline 400 mg oral tablet, extended release: 1 tab(s) orally once a day (at bedtime)  pentoxifylline 400 mg oral tablet, extended release: 2 tab(s) orally once a day  predniSONE:   spironolactone 25 mg oral tablet: 2 tab(s) orally once a day

## 2020-03-26 NOTE — CONSULT NOTE ADULT - ASSESSMENT
Patient s/p heart transplant 13 years ago. No cardiac symptoms . He appears non toxic. Hx CKD 4. Troponin acceptable. Check EKG. Rx uri. If stable d/c teele. Continue transplant meds
75 yo m with h/o heart transplant, HTN, DM2, CKD, HLD, COPD, hypothyroidism presenting with cough ruled in for Covid     ·	CKD 4 / heart transplant   ·	creat at baseline  ·	no further renal work up  ·	continue MMF/ Zortress current dose   ·	will need OP renal follow up   ·	Covid 19 positive management as per ID   ·	Anemia chronic will need to check Fe studies / may need HAZEL (can be done as OP)
Patient is a 74y old  Male with h/o heart transplant, HTN, DM2, CKD, HLD, COPD, hypothyroidism presented to ER for evaluation of fever and cough of 2 days. He took tylenol as needed for fever and did not seek medical help until the day of admission, 3/24/20,  when he developed chills with worsening condition. He denied any sick contact and lives with his wife at home. Denied chest pain, palpitations but has mild SOB.  In ER, he found to have fever, tachycardia but not hypoxic . The CXR shows no consolidation. He has started on Ceftriaxone and Azithromycin, and the ID consult requested to assist with further evaluation and antibiotic management.     # Suspected  COVID 19 pneumonia  # Sepsis on admission ( fever + Tachycardia )   # JUAN JOSE    would recommend:    1. Follow up Blood culture and COVID 19 PCR  2. COVID precaution  3. Monitor Temp. and c/w supportive care  4. Continue Ceftriaxone and Azithromycin until work up is done  5. Monitor for decompensation  6. Oral care and Aspiration precaution  7. Monitor  Kidney function and if no improvement ,then need Nephrology evaluation       will follow the patient with you and make further recommendation based on the clinical course and Lab results  Thank you for the opportunity to participate in Mr. RODRIGUEZ's care      Attending Attestation:    Spent more than 65 minutes on total encounter, more than 50 % of the visit was spent counseling and/or coordinating care by the Attending physician.

## 2020-03-26 NOTE — DISCHARGE NOTE PROVIDER - PROVIDER TOKENS
FREE:[LAST:[Andrzej (Cardiology)],FIRST:[Halie],PHONE:[(   )    -],FAX:[(   )    -],FOLLOWUP:[2 weeks]]

## 2020-03-26 NOTE — DISCHARGE NOTE NURSING/CASE MANAGEMENT/SOCIAL WORK - PATIENT PORTAL LINK FT
You can access the FollowMyHealth Patient Portal offered by [facility] by registering at the following website: [email or web address]. By joining [facility]’s FollowMyHealth portal, you will also be able to view your health information using other applications (apps) compatible with our system.

## 2020-03-26 NOTE — DISCHARGE NOTE PROVIDER - NSDCCPCAREPLAN_GEN_ALL_CORE_FT
PRINCIPAL DISCHARGE DIAGNOSIS  Diagnosis: Fever  Assessment and Plan of Treatment: COVID+. Per ID, pt can be DC self-isolate at home for 14 days. Advised patient to stay well-hydrated and encouraged PO intake as tolerated. Informed patient that fevers will recur as his body clears out viral infection. He should take tylenol as needed at home.  - CMV and GI PCR pending, will contact patient's transplant cardiologist once results available      SECONDARY DISCHARGE DIAGNOSES  Diagnosis: Cough  Assessment and Plan of Treatment:

## 2020-03-26 NOTE — PROGRESS NOTE ADULT - SUBJECTIVE AND OBJECTIVE BOX
TANK RODRIGUEZ  74y, Male  Allergy: No Known Allergies      CHIEF COMPLAINT: fever and cough (26 Mar 2020 06:53)      INTERVAL EVENTS/HPI  - No acute events overnight  - T(F): , Max: 102.3 (03-25-20 @ 21:00)  - Denies any worsening symptoms      ROS  10 system review - weakness    SOCIAL HISTORY  Social History:  Ex smoker  Does not drink alcohol  Lives with wife (24 Mar 2020 14:30)        FH noncontributory     VITALS:  T(F): 98.7, Max: 102.3 (03-25-20 @ 21:00)  HR: 112  BP: 120/56  RR: 18Vital Signs Last 24 Hrs  T(C): 37.1 (26 Mar 2020 05:37), Max: 39.1 (25 Mar 2020 21:00)  T(F): 98.7 (26 Mar 2020 05:37), Max: 102.3 (25 Mar 2020 21:00)  HR: 112 (26 Mar 2020 05:37) (96 - 118)  BP: 120/56 (26 Mar 2020 05:37) (120/56 - 139/63)  BP(mean): --  RR: 18 (26 Mar 2020 05:37) (17 - 18)  SpO2: 97% (26 Mar 2020 05:36) (96% - 98%)    PHYSICAL EXAM:  Gen: NAD, resting in bed  HEENT: Normocephalic, atraumatic  Neck: supple, no lymphadenopathy  CV: s1 s 2 +   Lungs: decreased BS   Abdomen: Soft, BS present  Ext: Warm, well perfused  Neuro: non focal, awake  Skin: no rash, no erythema      TESTS & MEASUREMENTS:                        9.1    7.37  )-----------( 174      ( 25 Mar 2020 06:49 )             29.3     03-25    136  |  104  |  40<H>  ----------------------------<  66<L>  3.9   |  16<L>  |  3.6<H>    Ca    8.1<L>      25 Mar 2020 06:49    TPro  5.6<L>  /  Alb  3.5  /  TBili  <0.2  /  DBili  x   /  AST  26  /  ALT  21  /  AlkPhos  25<L>  03-25      LIVER FUNCTIONS - ( 25 Mar 2020 06:49 )  Alb: 3.5 g/dL / Pro: 5.6 g/dL / ALK PHOS: 25 U/L / ALT: 21 U/L / AST: 26 U/L / GGT: x               Culture - Blood (collected 03-24-20 @ 09:36)  Source: .Blood Blood  Preliminary Report (03-25-20 @ 22:01):    No growth to date.        Lactate, Blood: 0.9 mmol/L (03-24-20 @ 09:36)      INFECTIOUS DISEASES TESTING      RADIOLOGY & ADDITIONAL TESTS:  I have personally reviewed the last Chest xray  CXR  Xray Chest 1 View- PORTABLE-Urgent:   EXAM:  XR CHEST PORTABLE URGENT 1V            PROCEDURE DATE:  03/24/2020            INTERPRETATION:  Clinical History/Reason for Exam:  cough    Comparison: 8/5/2009 chest x-ray.      Findings:    Technique/Positioning:  Frontal film.    Support devices:  none    Cardiac/mediastinum/hilum: Stable cardiomegaly with calcified aorta    Lung parenchyma/ Pleura: No consolidation effusion or pneumothorax      Skeleton/soft tissues: Stable osseous structures. Post cholecystectomy. Surgical clip overlies left hemidiaphragm.      Impression:    No consolidation effusion or pneumothorax.                          RADHA TEE M.D., ATTENDING RADIOLOGIST  This document has been electronically signed. Mar 24 2020 10:59AM             (03-24-20 @ 08:31)      CT      CARDIOLOGY TESTING  12 Lead ECG:   Ventricular Rate 102 BPM    Atrial Rate 102 BPM    P-R Interval 112 ms    QRS Duration 110 ms    Q-T Interval 508 ms    QTC Calculation(Bezet) 662 ms    R Axis 64 degrees    T Axis 74 degrees    Diagnosis Line Sinus tachycardia  Incomplete right bundle branch block  Anterior infarct , age undetermined      Confirmed by HARMAN DELGADO MD ([id number]) on 3/24/2020 11:36:57 AM (03-24-20 @ 10:54)      MEDICATIONS  albuterol/ipratropium for Nebulization 3  amLODIPine   Tablet 10  atorvastatin 40  azithromycin  IVPB 500  cefTRIAXone   IVPB 1000  dextrose 5%. 1000  dextrose 50% Injectable 12.5  dextrose 50% Injectable 25  dextrose 50% Injectable 25  everolimus (ZORTRESS) 1  fenofibrate Tablet 48  influenza   Vaccine 0.5  insulin glargine Injectable (LANTUS) 10  insulin lispro (HumaLOG) corrective regimen sliding scale   levothyroxine 25  mycophenolate mofetil 1000  pentoxifylline 400  pentoxifylline 800  predniSONE   Tablet 2  spironolactone 50      ANTIBIOTICS:  azithromycin  IVPB 500 milliGRAM(s) IV Intermittent every 24 hours  cefTRIAXone   IVPB 1000 milliGRAM(s) IV Intermittent every 24 hours

## 2020-03-26 NOTE — PROGRESS NOTE ADULT - SUBJECTIVE AND OBJECTIVE BOX
TANK RODRIGUEZ  74y, Male  Allergy: No Known Allergies    Hospital Day: 2d    Patient seen and examined earlier today. No acute events overnight.    PMH/PSH:  PAST MEDICAL & SURGICAL HISTORY:  COPD without exacerbation  Kidney disease  Hypothyroidism  Diabetes  HTN (hypertension)  H/O heart transplant    VITALS:  T(F): 97.8 (03-26-20 @ 08:45), Max: 102.3 (03-25-20 @ 21:00)  HR: 109 (03-26-20 @ 08:45)  BP: 118/57 (03-26-20 @ 08:45) (118/57 - 139/63)  RR: 17 (03-26-20 @ 08:45)  SpO2: 98% (03-26-20 @ 08:45)    TESTS & MEASUREMENTS:  Weight (Kg): 79.5 (03-24-20 @ 15:20)  BMI (kg/m2): 30.1 (03-24)    03-24-20 @ 07:01  -  03-25-20 @ 07:00  --------------------------------------------------------  IN: 0 mL / OUT: 600 mL / NET: -600 mL    03-25-20 @ 07:01  -  03-26-20 @ 07:00  --------------------------------------------------------  IN: 1060 mL / OUT: 4 mL / NET: 1056 mL                        11.3   8.38  )-----------( 173      ( 26 Mar 2020 08:43 )             36.2     03-26    135  |  102  |  52<H>  ----------------------------<  111<H>  4.2   |  16<L>  |  4.1<HH>    Ca    8.1<L>      26 Mar 2020 08:43    TPro  5.6<L>  /  Alb  3.5  /  TBili  <0.2  /  DBili  x   /  AST  26  /  ALT  21  /  AlkPhos  25<L>  03-25    LIVER FUNCTIONS - ( 25 Mar 2020 06:49 )  Alb: 3.5 g/dL / Pro: 5.6 g/dL / ALK PHOS: 25 U/L / ALT: 21 U/L / AST: 26 U/L / GGT: x           CARDIAC MARKERS ( 25 Mar 2020 06:49 )  x     / 0.09 ng/mL / x     / x     / x        Culture - Blood (collected 03-24-20 @ 09:36)  Source: .Blood Blood  Preliminary Report (03-25-20 @ 22:01):    No growth to date.    RECENT DIAGNOSTIC ORDERS:  12 Lead ECG (03-25-20 @ 14:36)  GI PCR Panel, Stool: Routine  Specimen Source: Feces (03-25-20 @ 15:40)  Cytomegalovirus IgM Antibody, Serum: AM Sched. Collection: 26-Mar-2020 04:30 (03-25-20 @ 15:40)  Cytomegalovirus DNA Detection By PCR: AM Sched. Collection: 26-Mar-2020 04:30 (03-25-20 @ 15:40)    MEDICATIONS:  MEDICATIONS  (STANDING):  albuterol/ipratropium for Nebulization 3 milliLiter(s) Nebulizer every 6 hours  amLODIPine   Tablet 10 milliGRAM(s) Oral daily  atorvastatin 40 milliGRAM(s) Oral at bedtime  dextrose 5%. 1000 milliLiter(s) (50 mL/Hr) IV Continuous <Continuous>  dextrose 50% Injectable 12.5 Gram(s) IV Push once  dextrose 50% Injectable 25 Gram(s) IV Push once  dextrose 50% Injectable 25 Gram(s) IV Push once  everolimus (ZORTRESS) 1 milliGRAM(s) Oral two times a day  fenofibrate Tablet 48 milliGRAM(s) Oral daily  influenza   Vaccine 0.5 milliLiter(s) IntraMuscular once  insulin glargine Injectable (LANTUS) 10 Unit(s) SubCutaneous at bedtime  insulin lispro (HumaLOG) corrective regimen sliding scale   SubCutaneous three times a day before meals  levothyroxine 25 MICROGram(s) Oral daily  mycophenolate mofetil 1000 milliGRAM(s) Oral two times a day  pentoxifylline 400 milliGRAM(s) Oral at bedtime  pentoxifylline 800 milliGRAM(s) Oral daily  predniSONE   Tablet 2 milliGRAM(s) Oral two times a day  sodium chloride 0.9%. 1000 milliLiter(s) (100 mL/Hr) IV Continuous <Continuous>  spironolactone 50 milliGRAM(s) Oral daily    MEDICATIONS  (PRN):  acetaminophen   Tablet .. 650 milliGRAM(s) Oral every 6 hours PRN Temp greater or equal to 38C (100.4F)  aluminum hydroxide/magnesium hydroxide/simethicone Suspension 30 milliLiter(s) Oral every 4 hours PRN Dyspepsia  dextrose 40% Gel 15 Gram(s) Oral once PRN Blood Glucose LESS THAN 70 milliGRAM(s)/deciliter  glucagon  Injectable 1 milliGRAM(s) IntraMuscular once PRN Glucose LESS THAN 70 milligrams/deciliter    HOME MEDICATIONS:  CellCept (07-26)  everolimus (07-26)  insulin (07-26)  levothyroxine (07-26)  predniSONE (07-26)      REVIEW OF SYSTEMS:  All other review of systems is negative unless indicated above.     PHYSICAL EXAM:  GENERAL: NAD  HEENT: No Swelling  CHEST/LUNG: Good air entry, No wheezing  HEART: RRR, No murmurs  ABDOMEN: Soft, Bowel sounds present  EXTREMITIES:  No clubbing

## 2020-03-27 LAB
CMV IGM FLD-ACNC: <8 AU/ML — SIGNIFICANT CHANGE UP
CMV IGM SERPL QL: NEGATIVE — SIGNIFICANT CHANGE UP
CULTURE RESULTS: SIGNIFICANT CHANGE UP
GLUCOSE BLDC GLUCOMTR-MCNC: 139 MG/DL — HIGH (ref 70–99)
SPECIMEN SOURCE: SIGNIFICANT CHANGE UP

## 2020-03-29 ENCOUNTER — INPATIENT (INPATIENT)
Facility: HOSPITAL | Age: 75
LOS: 3 days | End: 2020-04-02
Attending: INTERNAL MEDICINE | Admitting: INTERNAL MEDICINE
Payer: MEDICARE

## 2020-03-29 VITALS
SYSTOLIC BLOOD PRESSURE: 113 MMHG | HEIGHT: 64 IN | RESPIRATION RATE: 28 BRPM | TEMPERATURE: 98 F | WEIGHT: 149.91 LBS | HEART RATE: 120 BPM | OXYGEN SATURATION: 80 % | DIASTOLIC BLOOD PRESSURE: 57 MMHG

## 2020-03-29 DIAGNOSIS — U07.1 COVID-19: ICD-10-CM

## 2020-03-29 DIAGNOSIS — E78.5 HYPERLIPIDEMIA, UNSPECIFIED: ICD-10-CM

## 2020-03-29 DIAGNOSIS — Z94.1 HEART TRANSPLANT STATUS: Chronic | ICD-10-CM

## 2020-03-29 DIAGNOSIS — Z94.1 HEART TRANSPLANT STATUS: ICD-10-CM

## 2020-03-29 DIAGNOSIS — E11.22 TYPE 2 DIABETES MELLITUS WITH DIABETIC CHRONIC KIDNEY DISEASE: ICD-10-CM

## 2020-03-29 DIAGNOSIS — I12.9 HYPERTENSIVE CHRONIC KIDNEY DISEASE WITH STAGE 1 THROUGH STAGE 4 CHRONIC KIDNEY DISEASE, OR UNSPECIFIED CHRONIC KIDNEY DISEASE: ICD-10-CM

## 2020-03-29 DIAGNOSIS — Z79.4 LONG TERM (CURRENT) USE OF INSULIN: ICD-10-CM

## 2020-03-29 DIAGNOSIS — I46.9 CARDIAC ARREST, CAUSE UNSPECIFIED: ICD-10-CM

## 2020-03-29 DIAGNOSIS — R65.21 SEVERE SEPSIS WITH SEPTIC SHOCK: ICD-10-CM

## 2020-03-29 DIAGNOSIS — Z87.891 PERSONAL HISTORY OF NICOTINE DEPENDENCE: ICD-10-CM

## 2020-03-29 DIAGNOSIS — J80 ACUTE RESPIRATORY DISTRESS SYNDROME: ICD-10-CM

## 2020-03-29 DIAGNOSIS — N17.9 ACUTE KIDNEY FAILURE, UNSPECIFIED: ICD-10-CM

## 2020-03-29 DIAGNOSIS — A41.89 OTHER SPECIFIED SEPSIS: ICD-10-CM

## 2020-03-29 DIAGNOSIS — E87.2 ACIDOSIS: ICD-10-CM

## 2020-03-29 DIAGNOSIS — J44.0 CHRONIC OBSTRUCTIVE PULMONARY DISEASE WITH (ACUTE) LOWER RESPIRATORY INFECTION: ICD-10-CM

## 2020-03-29 DIAGNOSIS — N18.4 CHRONIC KIDNEY DISEASE, STAGE 4 (SEVERE): ICD-10-CM

## 2020-03-29 DIAGNOSIS — J12.89 OTHER VIRAL PNEUMONIA: ICD-10-CM

## 2020-03-29 DIAGNOSIS — I45.10 UNSPECIFIED RIGHT BUNDLE-BRANCH BLOCK: ICD-10-CM

## 2020-03-29 DIAGNOSIS — E03.9 HYPOTHYROIDISM, UNSPECIFIED: ICD-10-CM

## 2020-03-29 LAB
CULTURE RESULTS: SIGNIFICANT CHANGE UP
SPECIMEN SOURCE: SIGNIFICANT CHANGE UP

## 2020-03-29 PROCEDURE — 99291 CRITICAL CARE FIRST HOUR: CPT | Mod: 25,GC

## 2020-03-29 PROCEDURE — 36556 INSERT NON-TUNNEL CV CATH: CPT | Mod: GC

## 2020-03-29 PROCEDURE — 31500 INSERT EMERGENCY AIRWAY: CPT | Mod: GC

## 2020-03-29 RX ORDER — ACETAMINOPHEN 500 MG
650 TABLET ORAL ONCE
Refills: 0 | Status: COMPLETED | OUTPATIENT
Start: 2020-03-29 | End: 2020-03-29

## 2020-03-29 RX ORDER — SODIUM CHLORIDE 9 MG/ML
2000 INJECTION, SOLUTION INTRAVENOUS ONCE
Refills: 0 | Status: COMPLETED | OUTPATIENT
Start: 2020-03-29 | End: 2020-03-29

## 2020-03-30 PROBLEM — J44.9 CHRONIC OBSTRUCTIVE PULMONARY DISEASE, UNSPECIFIED: Chronic | Status: ACTIVE | Noted: 2020-03-24

## 2020-03-30 LAB
ALBUMIN SERPL ELPH-MCNC: 3 G/DL — LOW (ref 3.5–5.2)
ALBUMIN SERPL ELPH-MCNC: 3.4 G/DL — LOW (ref 3.5–5.2)
ALP SERPL-CCNC: 37 U/L — SIGNIFICANT CHANGE UP (ref 30–115)
ALP SERPL-CCNC: 41 U/L — SIGNIFICANT CHANGE UP (ref 30–115)
ALT FLD-CCNC: 29 U/L — SIGNIFICANT CHANGE UP (ref 0–41)
ALT FLD-CCNC: 34 U/L — SIGNIFICANT CHANGE UP (ref 0–41)
ANION GAP SERPL CALC-SCNC: 20 MMOL/L — HIGH (ref 7–14)
ANION GAP SERPL CALC-SCNC: 20 MMOL/L — HIGH (ref 7–14)
ANISOCYTOSIS BLD QL: SLIGHT — SIGNIFICANT CHANGE UP
APTT BLD: 33.1 SEC — SIGNIFICANT CHANGE UP (ref 27–39.2)
AST SERPL-CCNC: 62 U/L — HIGH (ref 0–41)
AST SERPL-CCNC: 71 U/L — HIGH (ref 0–41)
B-OH-BUTYR SERPL-SCNC: 1.2 MMOL/L — HIGH
BASE EXCESS BLDV CALC-SCNC: -11.2 MMOL/L — LOW (ref -2–2)
BASOPHILS # BLD AUTO: 0 K/UL — SIGNIFICANT CHANGE UP (ref 0–0.2)
BASOPHILS # BLD AUTO: 0 K/UL — SIGNIFICANT CHANGE UP (ref 0–0.2)
BASOPHILS NFR BLD AUTO: 0 % — SIGNIFICANT CHANGE UP (ref 0–1)
BASOPHILS NFR BLD AUTO: 0 % — SIGNIFICANT CHANGE UP (ref 0–1)
BILIRUB SERPL-MCNC: <0.2 MG/DL — SIGNIFICANT CHANGE UP (ref 0.2–1.2)
BILIRUB SERPL-MCNC: <0.2 MG/DL — SIGNIFICANT CHANGE UP (ref 0.2–1.2)
BUN SERPL-MCNC: 62 MG/DL — CRITICAL HIGH (ref 10–20)
BUN SERPL-MCNC: 73 MG/DL — CRITICAL HIGH (ref 10–20)
CA-I SERPL-SCNC: 1.05 MMOL/L — LOW (ref 1.12–1.3)
CALCIUM SERPL-MCNC: 6.9 MG/DL — LOW (ref 8.5–10.1)
CALCIUM SERPL-MCNC: 7.4 MG/DL — LOW (ref 8.5–10.1)
CHLORIDE SERPL-SCNC: 101 MMOL/L — SIGNIFICANT CHANGE UP (ref 98–110)
CHLORIDE SERPL-SCNC: 101 MMOL/L — SIGNIFICANT CHANGE UP (ref 98–110)
CO2 SERPL-SCNC: 12 MMOL/L — LOW (ref 17–32)
CO2 SERPL-SCNC: 17 MMOL/L — SIGNIFICANT CHANGE UP (ref 17–32)
CREAT SERPL-MCNC: 4.5 MG/DL — CRITICAL HIGH (ref 0.7–1.5)
CREAT SERPL-MCNC: 5.4 MG/DL — CRITICAL HIGH (ref 0.7–1.5)
D DIMER BLD IA.RAPID-MCNC: 707 NG/ML DDU — HIGH (ref 0–230)
EOSINOPHIL # BLD AUTO: 0 K/UL — SIGNIFICANT CHANGE UP (ref 0–0.7)
EOSINOPHIL # BLD AUTO: 0 K/UL — SIGNIFICANT CHANGE UP (ref 0–0.7)
EOSINOPHIL NFR BLD AUTO: 0 % — SIGNIFICANT CHANGE UP (ref 0–8)
EOSINOPHIL NFR BLD AUTO: 0 % — SIGNIFICANT CHANGE UP (ref 0–8)
GAS PNL BLDA: SIGNIFICANT CHANGE UP
GAS PNL BLDV: 135 MMOL/L — LOW (ref 136–145)
GAS PNL BLDV: SIGNIFICANT CHANGE UP
GIANT PLATELETS BLD QL SMEAR: PRESENT — SIGNIFICANT CHANGE UP
GLUCOSE BLDC GLUCOMTR-MCNC: 278 MG/DL — HIGH (ref 70–99)
GLUCOSE BLDC GLUCOMTR-MCNC: 306 MG/DL — HIGH (ref 70–99)
GLUCOSE BLDC GLUCOMTR-MCNC: 378 MG/DL — HIGH (ref 70–99)
GLUCOSE BLDC GLUCOMTR-MCNC: 408 MG/DL — HIGH (ref 70–99)
GLUCOSE BLDC GLUCOMTR-MCNC: 451 MG/DL — CRITICAL HIGH (ref 70–99)
GLUCOSE SERPL-MCNC: 170 MG/DL — HIGH (ref 70–99)
GLUCOSE SERPL-MCNC: 419 MG/DL — HIGH (ref 70–99)
HCO3 BLDV-SCNC: 14 MMOL/L — LOW (ref 22–29)
HCT VFR BLD CALC: 33.6 % — LOW (ref 42–52)
HCT VFR BLD CALC: 35.7 % — LOW (ref 42–52)
HCT VFR BLDA CALC: 35.4 % — SIGNIFICANT CHANGE UP (ref 34–44)
HGB BLD CALC-MCNC: 11.5 G/DL — LOW (ref 14–18)
HGB BLD-MCNC: 10.6 G/DL — LOW (ref 14–18)
HGB BLD-MCNC: 11.4 G/DL — LOW (ref 14–18)
IMM GRANULOCYTES NFR BLD AUTO: 0.8 % — HIGH (ref 0.1–0.3)
INR BLD: 1.29 RATIO — SIGNIFICANT CHANGE UP (ref 0.65–1.3)
LACTATE BLDV-MCNC: 2.7 MMOL/L — HIGH (ref 0.5–1.6)
LDH SERPL L TO P-CCNC: 671 U/L — HIGH (ref 50–242)
LYMPHOCYTES # BLD AUTO: 0.2 K/UL — LOW (ref 1.2–3.4)
LYMPHOCYTES # BLD AUTO: 0.31 K/UL — LOW (ref 1.2–3.4)
LYMPHOCYTES # BLD AUTO: 2.6 % — LOW (ref 20.5–51.1)
LYMPHOCYTES # BLD AUTO: 3.4 % — LOW (ref 20.5–51.1)
MAGNESIUM SERPL-MCNC: 2.4 MG/DL — SIGNIFICANT CHANGE UP (ref 1.8–2.4)
MANUAL SMEAR VERIFICATION: SIGNIFICANT CHANGE UP
MCHC RBC-ENTMCNC: 28.5 PG — SIGNIFICANT CHANGE UP (ref 27–31)
MCHC RBC-ENTMCNC: 29.2 PG — SIGNIFICANT CHANGE UP (ref 27–31)
MCHC RBC-ENTMCNC: 31.5 G/DL — LOW (ref 32–37)
MCHC RBC-ENTMCNC: 31.9 G/DL — LOW (ref 32–37)
MCV RBC AUTO: 89.3 FL — SIGNIFICANT CHANGE UP (ref 80–94)
MCV RBC AUTO: 92.6 FL — SIGNIFICANT CHANGE UP (ref 80–94)
MICROCYTES BLD QL: SLIGHT — SIGNIFICANT CHANGE UP
MONOCYTES # BLD AUTO: 0.48 K/UL — SIGNIFICANT CHANGE UP (ref 0.1–0.6)
MONOCYTES # BLD AUTO: 0.54 K/UL — SIGNIFICANT CHANGE UP (ref 0.1–0.6)
MONOCYTES NFR BLD AUTO: 5.3 % — SIGNIFICANT CHANGE UP (ref 1.7–9.3)
MONOCYTES NFR BLD AUTO: 7 % — SIGNIFICANT CHANGE UP (ref 1.7–9.3)
NEUTROPHILS # BLD AUTO: 6.86 K/UL — HIGH (ref 1.4–6.5)
NEUTROPHILS # BLD AUTO: 8.17 K/UL — HIGH (ref 1.4–6.5)
NEUTROPHILS NFR BLD AUTO: 83.3 % — HIGH (ref 42.2–75.2)
NEUTROPHILS NFR BLD AUTO: 90.5 % — HIGH (ref 42.2–75.2)
NEUTS BAND # BLD: 6.2 % — HIGH (ref 0–6)
NRBC # BLD: 0 /100 WBCS — SIGNIFICANT CHANGE UP (ref 0–0)
PCO2 BLDV: 30 MMHG — LOW (ref 41–51)
PH BLDV: 7.29 — SIGNIFICANT CHANGE UP (ref 7.26–7.43)
PHOSPHATE SERPL-MCNC: 9.6 MG/DL — HIGH (ref 2.1–4.9)
PLAT MORPH BLD: NORMAL — SIGNIFICANT CHANGE UP
PLATELET # BLD AUTO: 189 K/UL — SIGNIFICANT CHANGE UP (ref 130–400)
PLATELET # BLD AUTO: 198 K/UL — SIGNIFICANT CHANGE UP (ref 130–400)
PO2 BLDV: 23 MMHG — SIGNIFICANT CHANGE UP (ref 20–40)
POIKILOCYTOSIS BLD QL AUTO: SIGNIFICANT CHANGE UP
POLYCHROMASIA BLD QL SMEAR: SLIGHT — SIGNIFICANT CHANGE UP
POTASSIUM BLDV-SCNC: 3.9 MMOL/L — SIGNIFICANT CHANGE UP (ref 3.3–5.6)
POTASSIUM SERPL-MCNC: 4.1 MMOL/L — SIGNIFICANT CHANGE UP (ref 3.5–5)
POTASSIUM SERPL-MCNC: 5 MMOL/L — SIGNIFICANT CHANGE UP (ref 3.5–5)
POTASSIUM SERPL-SCNC: 4.1 MMOL/L — SIGNIFICANT CHANGE UP (ref 3.5–5)
POTASSIUM SERPL-SCNC: 5 MMOL/L — SIGNIFICANT CHANGE UP (ref 3.5–5)
PROT SERPL-MCNC: 5.1 G/DL — LOW (ref 6–8)
PROT SERPL-MCNC: 5.9 G/DL — LOW (ref 6–8)
PROTHROM AB SERPL-ACNC: 14.8 SEC — HIGH (ref 9.95–12.87)
RBC # BLD: 3.63 M/UL — LOW (ref 4.7–6.1)
RBC # BLD: 4 M/UL — LOW (ref 4.7–6.1)
RBC # FLD: 13.9 % — SIGNIFICANT CHANGE UP (ref 11.5–14.5)
RBC # FLD: 14.4 % — SIGNIFICANT CHANGE UP (ref 11.5–14.5)
RBC BLD AUTO: NORMAL — SIGNIFICANT CHANGE UP
SAO2 % BLDV: 32 % — SIGNIFICANT CHANGE UP
SMUDGE CELLS # BLD: PRESENT — SIGNIFICANT CHANGE UP
SODIUM SERPL-SCNC: 133 MMOL/L — LOW (ref 135–146)
SODIUM SERPL-SCNC: 138 MMOL/L — SIGNIFICANT CHANGE UP (ref 135–146)
VARIANT LYMPHS # BLD: 0.9 % — SIGNIFICANT CHANGE UP (ref 0–5)
WBC # BLD: 7.67 K/UL — SIGNIFICANT CHANGE UP (ref 4.8–10.8)
WBC # BLD: 9.03 K/UL — SIGNIFICANT CHANGE UP (ref 4.8–10.8)
WBC # FLD AUTO: 7.67 K/UL — SIGNIFICANT CHANGE UP (ref 4.8–10.8)
WBC # FLD AUTO: 9.03 K/UL — SIGNIFICANT CHANGE UP (ref 4.8–10.8)

## 2020-03-30 PROCEDURE — 71045 X-RAY EXAM CHEST 1 VIEW: CPT | Mod: 26,77

## 2020-03-30 PROCEDURE — 93010 ELECTROCARDIOGRAM REPORT: CPT

## 2020-03-30 PROCEDURE — 99221 1ST HOSP IP/OBS SF/LOW 40: CPT

## 2020-03-30 PROCEDURE — 71045 X-RAY EXAM CHEST 1 VIEW: CPT | Mod: 26

## 2020-03-30 RX ORDER — FOLIC ACID 0.8 MG
1 TABLET ORAL DAILY
Refills: 0 | Status: DISCONTINUED | OUTPATIENT
Start: 2020-03-30 | End: 2020-04-02

## 2020-03-30 RX ORDER — PENTOXIFYLLINE 400 MG
400 TABLET, EXTENDED RELEASE ORAL AT BEDTIME
Refills: 0 | Status: DISCONTINUED | OUTPATIENT
Start: 2020-03-30 | End: 2020-03-30

## 2020-03-30 RX ORDER — HEPARIN SODIUM 5000 [USP'U]/ML
5000 INJECTION INTRAVENOUS; SUBCUTANEOUS EVERY 8 HOURS
Refills: 0 | Status: DISCONTINUED | OUTPATIENT
Start: 2020-03-30 | End: 2020-04-02

## 2020-03-30 RX ORDER — ROCURONIUM BROMIDE 10 MG/ML
100 VIAL (ML) INTRAVENOUS ONCE
Refills: 0 | Status: COMPLETED | OUTPATIENT
Start: 2020-03-30 | End: 2020-03-31

## 2020-03-30 RX ORDER — PENTOXIFYLLINE 400 MG
400 TABLET, EXTENDED RELEASE ORAL
Refills: 0 | Status: DISCONTINUED | OUTPATIENT
Start: 2020-03-30 | End: 2020-04-02

## 2020-03-30 RX ORDER — MYCOPHENOLATE MOFETIL 250 MG/1
500 CAPSULE ORAL
Refills: 0 | Status: DISCONTINUED | OUTPATIENT
Start: 2020-03-30 | End: 2020-04-02

## 2020-03-30 RX ORDER — CHLORHEXIDINE GLUCONATE 213 G/1000ML
1 SOLUTION TOPICAL
Refills: 0 | Status: DISCONTINUED | OUTPATIENT
Start: 2020-03-30 | End: 2020-04-02

## 2020-03-30 RX ORDER — SODIUM CHLORIDE 9 MG/ML
1000 INJECTION, SOLUTION INTRAVENOUS
Refills: 0 | Status: DISCONTINUED | OUTPATIENT
Start: 2020-03-30 | End: 2020-03-31

## 2020-03-30 RX ORDER — MIDAZOLAM HYDROCHLORIDE 1 MG/ML
0.15 INJECTION, SOLUTION INTRAMUSCULAR; INTRAVENOUS
Qty: 100 | Refills: 0 | Status: DISCONTINUED | OUTPATIENT
Start: 2020-03-30 | End: 2020-04-01

## 2020-03-30 RX ORDER — PROPOFOL 10 MG/ML
10 INJECTION, EMULSION INTRAVENOUS
Qty: 1000 | Refills: 0 | Status: DISCONTINUED | OUTPATIENT
Start: 2020-03-30 | End: 2020-04-02

## 2020-03-30 RX ORDER — INSULIN HUMAN 100 [IU]/ML
2 INJECTION, SOLUTION SUBCUTANEOUS
Qty: 100 | Refills: 0 | Status: DISCONTINUED | OUTPATIENT
Start: 2020-03-30 | End: 2020-03-31

## 2020-03-30 RX ORDER — INSULIN HUMAN 100 [IU]/ML
8 INJECTION, SOLUTION SUBCUTANEOUS ONCE
Refills: 0 | Status: DISCONTINUED | OUTPATIENT
Start: 2020-03-30 | End: 2020-03-30

## 2020-03-30 RX ORDER — HYDROCORTISONE 20 MG
100 TABLET ORAL EVERY 8 HOURS
Refills: 0 | Status: DISCONTINUED | OUTPATIENT
Start: 2020-03-30 | End: 2020-03-31

## 2020-03-30 RX ORDER — SODIUM BICARBONATE 1 MEQ/ML
150 SYRINGE (ML) INTRAVENOUS ONCE
Refills: 0 | Status: COMPLETED | OUTPATIENT
Start: 2020-03-30 | End: 2020-03-30

## 2020-03-30 RX ORDER — ETOMIDATE 2 MG/ML
20 INJECTION INTRAVENOUS ONCE
Refills: 0 | Status: DISCONTINUED | OUTPATIENT
Start: 2020-03-30 | End: 2020-04-02

## 2020-03-30 RX ORDER — EVEROLIMUS 10 MG/1
1 TABLET ORAL
Refills: 0 | Status: DISCONTINUED | OUTPATIENT
Start: 2020-03-30 | End: 2020-03-30

## 2020-03-30 RX ORDER — SENNA PLUS 8.6 MG/1
2 TABLET ORAL AT BEDTIME
Refills: 0 | Status: DISCONTINUED | OUTPATIENT
Start: 2020-03-30 | End: 2020-04-02

## 2020-03-30 RX ORDER — AZITHROMYCIN 500 MG/1
500 TABLET, FILM COATED ORAL EVERY 24 HOURS
Refills: 0 | Status: DISCONTINUED | OUTPATIENT
Start: 2020-03-30 | End: 2020-03-30

## 2020-03-30 RX ORDER — FENTANYL CITRATE 50 UG/ML
0.5 INJECTION INTRAVENOUS
Qty: 2500 | Refills: 0 | Status: DISCONTINUED | OUTPATIENT
Start: 2020-03-30 | End: 2020-03-30

## 2020-03-30 RX ORDER — LEVOTHYROXINE SODIUM 125 MCG
25 TABLET ORAL DAILY
Refills: 0 | Status: DISCONTINUED | OUTPATIENT
Start: 2020-03-30 | End: 2020-04-02

## 2020-03-30 RX ORDER — INSULIN HUMAN 100 [IU]/ML
6 INJECTION, SOLUTION SUBCUTANEOUS
Qty: 100 | Refills: 0 | Status: DISCONTINUED | OUTPATIENT
Start: 2020-03-30 | End: 2020-03-30

## 2020-03-30 RX ORDER — FENOFIBRATE,MICRONIZED 130 MG
48 CAPSULE ORAL DAILY
Refills: 0 | Status: DISCONTINUED | OUTPATIENT
Start: 2020-03-30 | End: 2020-04-02

## 2020-03-30 RX ORDER — INSULIN DEGLUDEC 100 U/ML
0 INJECTION, SOLUTION SUBCUTANEOUS
Qty: 0 | Refills: 0 | DISCHARGE

## 2020-03-30 RX ORDER — HYDROXYCHLOROQUINE SULFATE 200 MG
400 TABLET ORAL EVERY 12 HOURS
Refills: 0 | Status: COMPLETED | OUTPATIENT
Start: 2020-03-30 | End: 2020-03-30

## 2020-03-30 RX ORDER — MORPHINE SULFATE 50 MG/1
2 CAPSULE, EXTENDED RELEASE ORAL ONCE
Refills: 0 | Status: DISCONTINUED | OUTPATIENT
Start: 2020-03-30 | End: 2020-03-30

## 2020-03-30 RX ORDER — PENTOXIFYLLINE 400 MG
400 TABLET, EXTENDED RELEASE ORAL AT BEDTIME
Refills: 0 | Status: DISCONTINUED | OUTPATIENT
Start: 2020-03-30 | End: 2020-04-02

## 2020-03-30 RX ORDER — MYCOPHENOLATE MOFETIL 250 MG/1
0 CAPSULE ORAL
Qty: 0 | Refills: 0 | DISCHARGE

## 2020-03-30 RX ORDER — FOLIC ACID 0.8 MG
0 TABLET ORAL
Qty: 0 | Refills: 0 | DISCHARGE

## 2020-03-30 RX ORDER — CEFTRIAXONE 500 MG/1
1000 INJECTION, POWDER, FOR SOLUTION INTRAMUSCULAR; INTRAVENOUS ONCE
Refills: 0 | Status: DISCONTINUED | OUTPATIENT
Start: 2020-03-30 | End: 2020-03-30

## 2020-03-30 RX ORDER — SODIUM CHLORIDE 9 MG/ML
1000 INJECTION INTRAMUSCULAR; INTRAVENOUS; SUBCUTANEOUS
Refills: 0 | Status: DISCONTINUED | OUTPATIENT
Start: 2020-03-30 | End: 2020-03-30

## 2020-03-30 RX ORDER — SODIUM BICARBONATE 1 MEQ/ML
0.17 SYRINGE (ML) INTRAVENOUS
Qty: 150 | Refills: 0 | Status: DISCONTINUED | OUTPATIENT
Start: 2020-03-30 | End: 2020-03-30

## 2020-03-30 RX ORDER — ATORVASTATIN CALCIUM 80 MG/1
40 TABLET, FILM COATED ORAL AT BEDTIME
Refills: 0 | Status: DISCONTINUED | OUTPATIENT
Start: 2020-03-30 | End: 2020-04-02

## 2020-03-30 RX ORDER — HYDROXYCHLOROQUINE SULFATE 200 MG
200 TABLET ORAL EVERY 12 HOURS
Refills: 0 | Status: DISCONTINUED | OUTPATIENT
Start: 2020-03-31 | End: 2020-04-02

## 2020-03-30 RX ORDER — NOREPINEPHRINE BITARTRATE/D5W 8 MG/250ML
0.1 PLASTIC BAG, INJECTION (ML) INTRAVENOUS
Qty: 16 | Refills: 0 | Status: DISCONTINUED | OUTPATIENT
Start: 2020-03-30 | End: 2020-04-02

## 2020-03-30 RX ORDER — HYDROXYCHLOROQUINE SULFATE 200 MG
TABLET ORAL
Refills: 0 | Status: DISCONTINUED | OUTPATIENT
Start: 2020-03-30 | End: 2020-04-02

## 2020-03-30 RX ADMIN — MIDAZOLAM HYDROCHLORIDE 1.36 MG/KG/HR: 1 INJECTION, SOLUTION INTRAMUSCULAR; INTRAVENOUS at 01:30

## 2020-03-30 RX ADMIN — AZITHROMYCIN 255 MILLIGRAM(S): 500 TABLET, FILM COATED ORAL at 03:00

## 2020-03-30 RX ADMIN — Medication 125 MILLIGRAM(S): at 18:55

## 2020-03-30 RX ADMIN — MIDAZOLAM HYDROCHLORIDE 10.2 MG/KG/HR: 1 INJECTION, SOLUTION INTRAMUSCULAR; INTRAVENOUS at 17:20

## 2020-03-30 RX ADMIN — MIDAZOLAM HYDROCHLORIDE 3 MG/KG/HR: 1 INJECTION, SOLUTION INTRAMUSCULAR; INTRAVENOUS at 09:34

## 2020-03-30 RX ADMIN — MIDAZOLAM HYDROCHLORIDE 10.2 MG/KG/HR: 1 INJECTION, SOLUTION INTRAMUSCULAR; INTRAVENOUS at 13:41

## 2020-03-30 RX ADMIN — PROPOFOL 3.9 MICROGRAM(S)/KG/MIN: 10 INJECTION, EMULSION INTRAVENOUS at 19:12

## 2020-03-30 RX ADMIN — Medication 1 MILLIGRAM(S): at 13:03

## 2020-03-30 RX ADMIN — Medication 100 MILLIGRAM(S): at 13:04

## 2020-03-30 RX ADMIN — INSULIN HUMAN 6 UNIT(S)/HR: 100 INJECTION, SOLUTION SUBCUTANEOUS at 18:07

## 2020-03-30 RX ADMIN — Medication 400 MILLIGRAM(S): at 18:07

## 2020-03-30 RX ADMIN — Medication 400 MILLIGRAM(S): at 18:08

## 2020-03-30 RX ADMIN — HEPARIN SODIUM 5000 UNIT(S): 5000 INJECTION INTRAVENOUS; SUBCUTANEOUS at 23:54

## 2020-03-30 RX ADMIN — MYCOPHENOLATE MOFETIL 500 MILLIGRAM(S): 250 CAPSULE ORAL at 18:08

## 2020-03-30 RX ADMIN — SODIUM CHLORIDE 2000 MILLILITER(S): 9 INJECTION, SOLUTION INTRAVENOUS at 01:30

## 2020-03-30 RX ADMIN — ATORVASTATIN CALCIUM 40 MILLIGRAM(S): 80 TABLET, FILM COATED ORAL at 23:55

## 2020-03-30 RX ADMIN — Medication 150 MILLIEQUIVALENT(S): at 08:49

## 2020-03-30 RX ADMIN — HEPARIN SODIUM 5000 UNIT(S): 5000 INJECTION INTRAVENOUS; SUBCUTANEOUS at 13:04

## 2020-03-30 RX ADMIN — SENNA PLUS 2 TABLET(S): 8.6 TABLET ORAL at 23:57

## 2020-03-30 RX ADMIN — Medication 6.09 MICROGRAM(S)/KG/MIN: at 19:11

## 2020-03-30 RX ADMIN — SODIUM CHLORIDE 100 MILLILITER(S): 9 INJECTION, SOLUTION INTRAVENOUS at 09:46

## 2020-03-30 RX ADMIN — MORPHINE SULFATE 2 MILLIGRAM(S): 50 CAPSULE, EXTENDED RELEASE ORAL at 10:54

## 2020-03-30 RX ADMIN — MORPHINE SULFATE 2 MILLIGRAM(S): 50 CAPSULE, EXTENDED RELEASE ORAL at 13:03

## 2020-03-30 NOTE — PROGRESS NOTE ADULT - SUBJECTIVE AND OBJECTIVE BOX
TANK ALMAZAN 74y Male  MRN#: [id number]   CODE STATUS:________      SUBJECTIVE  Patient is a 74y old Male who presents with a chief complaint of Hypoxia   COVID-19 + (30 Mar 2020 07:45)  Currently admitted to medicine with the primary diagnosis of Respiratory failure    Today is hospital day , and this morning he was evaluated at bedside, observed to be overbreathing the vent, currently sedated. Wife Kiera Almazan ([phone]), with an update on patient's current status. Wife is to bring his non formulary medications in today. Currently pending ID and Neophro consult.         OBJECTIVE  PAST MEDICAL & SURGICAL HISTORY  COPD without exacerbation  Kidney disease  Hypothyroidism  Diabetes  HTN (hypertension)  H/O heart transplant    ALLERGIES:  No Known Allergies    MEDICATIONS:  STANDING MEDICATIONS  atorvastatin 40 milliGRAM(s) Oral at bedtime  chlorhexidine 4% Liquid 1 Application(s) Topical <User Schedule>  dextrose 5% 1000 milliLiter(s) IV Continuous <Continuous>  etomidate Injectable 20 milliGRAM(s) IV Push once  everolimus (ZORTRESS) 1 milliGRAM(s) Oral two times a day  fenofibrate Tablet 48 milliGRAM(s) Oral daily  fentaNYL   Infusion 0.5 MICROgram(s)/kG/Hr IV Continuous <Continuous>  folic acid 1 milliGRAM(s) Oral daily  heparin  Injectable 5000 Unit(s) SubCutaneous every 8 hours  hydroxychloroquine   Oral   hydroxychloroquine 400 milliGRAM(s) Oral every 12 hours  levothyroxine 25 MICROGram(s) Oral daily  midazolam Infusion 0.02 mG/kG/Hr IV Continuous <Continuous>  pentoxifylline 400 milliGRAM(s) Oral two times a day  pentoxifylline 400 milliGRAM(s) Oral at bedtime  rocuronium Injectable 100 milliGRAM(s) IV Push once  sodium bicarbonate  Injectable 150 milliEquivalent(s) IV Push once    PRN MEDICATIONS      VITAL SIGNS: Last 24 Hours  T(C): 35.8 (30 Mar 2020 07:09), Max: 36.7 (29 Mar 2020 23:30)  T(F): 96.5 (30 Mar 2020 07:09), Max: 98 (29 Mar 2020 23:30)  HR: 109 (30 Mar 2020 07:09) (109 - 142)  BP: 115/55 (30 Mar 2020 07:09) (112/59 - 133/60)  BP(mean): 79 (30 Mar 2020 07:09) (79 - 87)  RR: 16 (30 Mar 2020 07:09) (16 - 28)  SpO2: 95% (30 Mar 2020 05:15) (80% - 99%)    LABS:                        11.4   7.67  )-----------( 198      ( 29 Mar 2020 23:45 )             35.7     03-29    133<L>  |  101  |  62<HH>  ----------------------------<  170<H>  4.1   |  12<L>  |  4.5<HH>    Ca    7.4<L>      29 Mar 2020 23:45    TPro  5.9<L>  /  Alb  3.4<L>  /  TBili  <0.2  /  DBili  x   /  AST  71<H>  /  ALT  34  /  AlkPhos  37  03-29    PT/INR - ( 29 Mar 2020 23:45 )   PT: 14.80 sec;   INR: 1.29 ratio         PTT - ( 29 Mar 2020 23:45 )  PTT:33.1 sec    ABG - ( 30 Mar 2020 05:50 )  pH, Arterial: 7.06  pH, Blood: x     /  pCO2: 48    /  pO2: 94    / HCO3: 14    / Base Excess: -16.2 /  SaO2: 93        RADIOLOGY:      PHYSICAL EXAM:    GENERAL: sedated, intubated,   HEENT:  Atraumatic, Normocephalic  PULMONARY: bilateral rhonchi +, bibasilar crackles  CARDIOVASCULAR: Regular rate and rhythm;   GASTROINTESTINAL: Soft, Nontender, Nondistended; Bowel sounds present  : Alonzo in place  MUSCULOSKELETAL:  2+ Peripheral Pulses, no edema

## 2020-03-30 NOTE — H&P ADULT - NSHPPHYSICALEXAM_GEN_ALL_CORE
Vital Signs Last 24 Hrs  T(F): 98 (29 Mar 2020 23:30), Max: 98 (29 Mar 2020 23:30)  HR: 142 (30 Mar 2020 00:38) (120 - 142)  BP: 113/57 (29 Mar 2020 23:30) (113/57 - 113/57)  RR: 28 (29 Mar 2020 23:30) (28 - 28)  SpO2: 99% (30 Mar 2020 00:38) (80% - 99%)    PHYSICAL EXAM:  GENERAL: sedated intubated   HEENT: NCAT   NECK: Supple. No lymphadenopathy or thyromegaly.  LUNGS: On MV no wheeze crackles or rubs CTAB  HEART: RRR, S1 S2 Audible  ABDOMEN: Soft, nontender, and nondistended.  No gaurding or rigidity.  No hepatosplenomegaly was noted.  EXTREMITIES: Without any cyanosis, clubbing, rash, lesions or edema.

## 2020-03-30 NOTE — CONSULT NOTE ADULT - SUBJECTIVE AND OBJECTIVE BOX
PAST HISTORY  --------------------------------------------------------------------------------  PAST MEDICAL & SURGICAL HISTORY:  COPD without exacerbation  Kidney disease  Hypothyroidism  Diabetes  HTN (hypertension)  H/O heart transplant    FAMILY HISTORY:    PAST SOCIAL HISTORY:    ALLERGIES & MEDICATIONS  --------------------------------------------------------------------------------  Allergies    No Known Allergies    Intolerances      Standing Inpatient Medications  atorvastatin 40 milliGRAM(s) Oral at bedtime  chlorhexidine 4% Liquid 1 Application(s) Topical <User Schedule>  dextrose 5% 1000 milliLiter(s) IV Continuous <Continuous>  etomidate Injectable 20 milliGRAM(s) IV Push once  everolimus (ZORTRESS) 1 milliGRAM(s) Oral two times a day  fenofibrate Tablet 48 milliGRAM(s) Oral daily  fentaNYL   Infusion 0.5 MICROgram(s)/kG/Hr IV Continuous <Continuous>  folic acid 1 milliGRAM(s) Oral daily  heparin  Injectable 5000 Unit(s) SubCutaneous every 8 hours  hydroxychloroquine   Oral   hydroxychloroquine 400 milliGRAM(s) Oral every 12 hours  levothyroxine 25 MICROGram(s) Oral daily  midazolam Infusion 0.044 mG/kG/Hr IV Continuous <Continuous>  pentoxifylline 400 milliGRAM(s) Oral two times a day  pentoxifylline 400 milliGRAM(s) Oral at bedtime  rocuronium Injectable 100 milliGRAM(s) IV Push once    PRN Inpatient Medications      VITALS/PHYSICAL EXAM  --------------------------------------------------------------------------------  T(C): 35.8 (03-30-20 @ 07:09), Max: 36.7 (03-29-20 @ 23:30)  HR: 87 (03-30-20 @ 07:30) (87 - 142)  BP: 115/55 (03-30-20 @ 07:09) (112/59 - 133/60)  RR: 16 (03-30-20 @ 07:09) (16 - 28)  SpO2: 97% (03-30-20 @ 07:30) (80% - 99%)  Wt(kg): --  Height (cm): 162.56 (03-29-20 @ 23:30)  Weight (kg): 65 (03-29-20 @ 23:30)  BMI (kg/m2): 24.6 (03-29-20 @ 23:30)  BSA (m2): 1.7 (03-29-20 @ 23:30)      Physical Exam:  	Gen: intubated/ventilated   	Pulm:  B/L tolu   	CV: S1S2; no rub  	Abd: +distended      LABS/STUDIES  --------------------------------------------------------------------------------              11.4   7.67  >-----------<  198      [03-29-20 @ 23:45]              35.7     133  |  101  |  62  ----------------------------<  170      [03-29-20 @ 23:45]  4.1   |  12  |  4.5        Ca     7.4     [03-29-20 @ 23:45]    TPro  5.9  /  Alb  3.4  /  TBili  <0.2  /  DBili  x   /  AST  71  /  ALT  34  /  AlkPhos  37  [03-29-20 @ 23:45]    PT/INR: PT 14.80, INR 1.29       [03-29-20 @ 23:45]  PTT: 33.1       [03-29-20 @ 23:45]      Creatinine Trend:  SCr 4.5 [03-29 @ 23:45]  SCr 4.1 [03-26 @ 08:43]  SCr 3.6 [03-25 @ 06:49]  SCr 3.6 [03-24 @ 09:36]    Urinalysis - [07-26-18 @ 14:20]      Color Yellow / Appearance Clear / SG 1.015 / pH 5.5      Gluc 250 / Ketone Negative  / Bili Negative / Urobili 0.2       Blood Trace-intact / Protein >=300 / Leuk Est Negative / Nitrite Negative      RBC 1-2 / WBC 1-2 / Hyaline  / Gran  / Sq Epi  / Non Sq Epi Occasional / Bacteria       HbA1c 7.1      [03-25-20 @ 06:49] 74M PMHx heart transplant (13 years ago) , HTN, DM2, CKD, HLD, COPD, hypothyroidism recently admitted for cough and SOB diagnosed with COVID-19 discharged in stable condition presents ER for evaluation of worsening SOB found to be in hypoxic respiratory failure   s/p intubation   called for JUAN JOSE on CKD           PAST HISTORY  --------------------------------------------------------------------------------  PAST MEDICAL & SURGICAL HISTORY:  COPD without exacerbation  Kidney disease  Hypothyroidism  Diabetes  HTN (hypertension)  H/O heart transplant    FAMILY HISTORY:    PAST SOCIAL HISTORY:    ALLERGIES & MEDICATIONS  --------------------------------------------------------------------------------  Allergies    No Known Allergies    Intolerances      Standing Inpatient Medications  atorvastatin 40 milliGRAM(s) Oral at bedtime  chlorhexidine 4% Liquid 1 Application(s) Topical <User Schedule>  dextrose 5% 1000 milliLiter(s) IV Continuous <Continuous>  etomidate Injectable 20 milliGRAM(s) IV Push once  everolimus (ZORTRESS) 1 milliGRAM(s) Oral two times a day  fenofibrate Tablet 48 milliGRAM(s) Oral daily  fentaNYL   Infusion 0.5 MICROgram(s)/kG/Hr IV Continuous <Continuous>  folic acid 1 milliGRAM(s) Oral daily  heparin  Injectable 5000 Unit(s) SubCutaneous every 8 hours  hydroxychloroquine   Oral   hydroxychloroquine 400 milliGRAM(s) Oral every 12 hours  levothyroxine 25 MICROGram(s) Oral daily  midazolam Infusion 0.044 mG/kG/Hr IV Continuous <Continuous>  pentoxifylline 400 milliGRAM(s) Oral two times a day  pentoxifylline 400 milliGRAM(s) Oral at bedtime  rocuronium Injectable 100 milliGRAM(s) IV Push once    PRN Inpatient Medications      VITALS/PHYSICAL EXAM  --------------------------------------------------------------------------------  T(C): 35.8 (03-30-20 @ 07:09), Max: 36.7 (03-29-20 @ 23:30)  HR: 87 (03-30-20 @ 07:30) (87 - 142)  BP: 115/55 (03-30-20 @ 07:09) (112/59 - 133/60)  RR: 16 (03-30-20 @ 07:09) (16 - 28)  SpO2: 97% (03-30-20 @ 07:30) (80% - 99%)  Wt(kg): --  Height (cm): 162.56 (03-29-20 @ 23:30)  Weight (kg): 65 (03-29-20 @ 23:30)  BMI (kg/m2): 24.6 (03-29-20 @ 23:30)  BSA (m2): 1.7 (03-29-20 @ 23:30)      Physical Exam:  	Gen: intubated/ventilated   	Pulm:  B/L tolu   	CV: S1S2; no rub  	Abd: +distended      LABS/STUDIES  --------------------------------------------------------------------------------              11.4   7.67  >-----------<  198      [03-29-20 @ 23:45]                35.7     133  |  101  |  62  ----------------------------<  170      [03-29-20 @ 23:45]  4.1   |  12  |  4.5        Ca     7.4     [03-29-20 @ 23:45]    TPro  5.9  /  Alb  3.4  /  TBili  <0.2  /  DBili  x   /  AST  71  /  ALT  34  /  AlkPhos  37  [03-29-20 @ 23:45]    PT/INR: PT 14.80, INR 1.29       [03-29-20 @ 23:45]  PTT: 33.1       [03-29-20 @ 23:45]  < from: Xray Chest 1 View AP/PA (03.30.20 @ 06:59) >  1. Substantially increased bilateral airspace opacities, right greater than left.    2. Lines and tubes as above.    < end of copied text >    Creatinine Trend:  SCr 4.5 [03-29 @ 23:45]  SCr 4.1 [03-26 @ 08:43]  SCr 3.6 [03-25 @ 06:49]  SCr 3.6 [03-24 @ 09:36]    Urinalysis - [07-26-18 @ 14:20]      Color Yellow / Appearance Clear / SG 1.015 / pH 5.5      Gluc 250 / Ketone Negative  / Bili Negative / Urobili 0.2       Blood Trace-intact / Protein >=300 / Leuk Est Negative / Nitrite Negative      RBC 1-2 / WBC 1-2 / Hyaline  / Gran  / Sq Epi  / Non Sq Epi Occasional / Bacteria       HbA1c 7.1      [03-25-20 @ 06:49]

## 2020-03-30 NOTE — PROGRESS NOTE ADULT - ASSESSMENT
74M PMHx heart transplant (13 years ago) , HTN, DM2, CKD, HLD, COPD, hypothyroidism recently admitted for cough and SOB diagnosed with COVID-19 discharged in stable condition presents ER for evaluation of worsening SOB found to be in hypoxic respiratory failure s/p intubation.     Acute Hypoxic Respiratory failure secondary to COVID-10, ARDS  - Recent admission + COVID   - current intubated/ sedated on versed, morphine pushes PRN  -Vnt settings adjusted this am : Rate 24. , FiO2: 100, PEEP increased to 10 (titrate to driving pressure)        Acute on Chronic Renal Failure IV 74M PMHx heart transplant (13 years ago) , HTN, DM2, CKD, HLD, COPD, hypothyroidism recently admitted for cough and SOB diagnosed with COVID-19 discharged in stable condition presents ER for evaluation of worsening SOB found to be in hypoxic respiratory failure s/p intubation.     Acute Hypoxic Respiratory failure secondary to COVID-10, ARDS  - Recent admission + COVID (recently discharged)  - Currently not on any pressures, vitally stable, afebrile  - current intubated/ sedated on versed, morphine pushes PRN  -Vent settings adjusted this am : Rate 24. , FiO2: 100, PEEP increased to 10 (titrate to driving pressure)  - CXR with worsening b/l ground glass opacities, right greater than left  - ID consult pending for possible TOCI  - Inflammatory markers pending, f/u ferritin, ldh, crp, procalcitonin  - + lymphopenia, no thrombocytopenia  - c/w Plaquenil day 2, EKG AM, last QTC: 469  - Wife updated about patient's current status, palliative consult pending    Jose on Chronic Renal Failure IV  High Anion Gap Metabolic Acidosis  - Creatinine elevated to 74M PMHx heart transplant (13 years ago) , HTN, DM2, CKD, HLD, COPD, hypothyroidism recently admitted for cough and SOB diagnosed with COVID-19 discharged in stable condition presents ER for evaluation of worsening SOB found to be in hypoxic respiratory failure s/p intubation.     Acute Hypoxic Respiratory failure secondary to COVID-10, ARDS  - Recent admission + COVID (recently discharged)  - Currently not on any pressures, vitally stable, afebrile  - current intubated/ sedated on versed, morphine pushes PRN  -Vent settings adjusted this am : Rate 24. , FiO2: 100, PEEP increased to 10 (titrate to driving pressure)  - CXR with worsening b/l ground glass opacities, right greater than left  - ID consult pending for possible TOCI  - Inflammatory markers pending, f/u ferritin, ldh, crp, procalcitonin  - + lymphopenia, no thrombocytopenia  - c/w Plaquenil day 2, EKG AM, last QTC: 469  - Wife updated about patient's current status, palliative consult pending    Jose on Chronic Renal Failure IV  High Anion Gap Metabolic Acidosis  - Creatinine elevated to 4.5 today  - Started on 3 amps of bicarb at 100  - Repeat CMP pm  - Per nephro: Spoke with Dr pérez [phone] , d/c everolimus, resume cellcept 500 BID 74M PMHx heart transplant (13 years ago) , HTN, DM2, CKD, HLD, COPD, hypothyroidism recently admitted for cough and SOB diagnosed with COVID-19 discharged in stable condition presents ER for evaluation of worsening SOB found to be in hypoxic respiratory failure s/p intubation.     Acute Hypoxic Respiratory failure secondary to COVID-10, ARDS  - Recent admission + COVID (recently discharged)  - Currently not on any pressures, vitally stable, afebrile  - current intubated/ sedated on versed, morphine pushes PRN  -Vent settings adjusted this am : Rate 24. , FiO2: 100, PEEP increased to 10 (titrate to driving pressure)  - CXR with worsening b/l ground glass opacities, right greater than left  - ID consult pending for possible TOCI  - Inflammatory markers pending, f/u ferritin, ldh, crp, procalcitonin  - + lymphopenia, no thrombocytopenia  - c/w Plaquenil day 2, EKG AM, last QTC: 469  - Wife updated about patient's current status, palliative consult pending    Jose on Chronic Renal Failure IV  High Anion Gap Metabolic Acidosis  - Creatinine elevated to 4.5 today  - Started on 3 amps of bicarb at 100  - Repeat CMP pm  - Per nephro: Spoke with Dr pérez [phone] , d/c everolimus, resume cellcept 500 BID  - f/u Cellcept level  - Wife to bring in home meds (pharmacy does not have currently)  - No RRT for now     S/P Heart transplant  - Heart transplant and pro graft 13 years ago, follows Dr. Pérez at [facility]  - c/w hydrocortisone 100 mg TID  - Per Dr pérez [phone] , d/c everolimus, resume cellcept 500 BID  - Wife to bring in Pantophyilline both non formulary and patient may take own medications  - [facility] d/w wife. Patient has no living will and is full code  - Given the very poor overall prognosis, palliative care consulted    DM type II  - FS q8  - Hba1c 7.2  - Insulin protocol if FS >180    Hypothyroidism   - c/w levothyroxine    DVT ppx: heparin  CODE: full  Diet: NPO except meds  Dispo: MICU monitoring

## 2020-03-30 NOTE — CHART NOTE - NSCHARTNOTEFT_GEN_A_CORE
Spoke to patient's daughter April (as the wife is currently sick, likely COVID + as well per daughter), updated on patient's current condition. Daughter's number is [phone].

## 2020-03-30 NOTE — H&P ADULT - NSHPLABSRESULTS_GEN_ALL_CORE
11.4   7.67  )-----------( 198      ( 29 Mar 2020 23:45 )             35.7   03-29    133<L>  |  101  |  62<HH>  ----------------------------<  170<H>  4.1   |  12<L>  |  4.5<HH>    Ca    7.4<L>      29 Mar 2020 23:45    TPro  5.9<L>  /  Alb  3.4<L>  /  TBili  <0.2  /  DBili  x   /  AST  71<H>  /  ALT  34  /  AlkPhos  37  03-29 11.4   7.67  )-----------( 198      ( 29 Mar 2020 23:45 )             35.7   03-29    133<L>  |  101  |  62<HH>  ----------------------------<  170<H>  4.1   |  12<L>  |  4.5<HH>    Ca    7.4<L>      29 Mar 2020 23:45    TPro  5.9<L>  /  Alb  3.4<L>  /  TBili  <0.2  /  DBili  x   /  AST  71<H>  /  ALT  34  /  AlkPhos  37  03-29    XRAY - worsening opacities > R. acute change since Xray noted 3/26/20

## 2020-03-30 NOTE — ED PROVIDER NOTE - ATTENDING CONTRIBUTION TO CARE
74M h/o cardiac transplant 1 year ago on Cellcept and prograft, CKD stage 4, HTN, hypothyroidism p/w respiratory distress, patient recently admitted and discharged from hospital, patient hypoxic to 60's on room air, daughter is bedside patient intubated in the ed for hypoxia and labored breathing, central line placed, started on antibiotics, endorsed and accepted to the ICU      CONSTITUTIONAL: + respiratory distress and labored breathing, speaking in 1-2 word sentences  SKIN: skin exam is warm and dry, no acute rash.  HEAD: Normocephalic; atraumatic.  EYES: PERRL, 3 mm bilateral, no nystagmus, EOM intact; conjunctiva and sclera clear.  ENT: + Pharyngeal erythema, no exudate, no edema, No nasal discharge; airway clear.  NECK: Supple; non tender. + full passive ROM in all directions. No JVD  CARD: S1, S2 normal; no murmurs, gallops, or rubs. Regular rate and rhythm. + Symmetric Strong Pulses  RESP: No wheezes, + Diffuse bibasilar rales and + expiratory rhonchi. Good air movement bilaterally  ABD: soft; non-distended; non-tender. No Rebound, No Guarding, No signs of peritonitis, No CVA tenderness. No pulsatile abdominal mass. + Strong and Symmetric Pulses  EXT: Normal ROM. No clubbing, cyanosis or edema. Dp and Pt Pulses intact. Cap refill less than 3 seconds  NEURO: Alert, oriented, grossly unremarkable. No Focal deficits. GCS 15. NIH 0  PSYCH: Cooperative, appropriate.

## 2020-03-30 NOTE — CONSULT NOTE ADULT - SUBJECTIVE AND OBJECTIVE BOX
Patient is a 74y old  Male who presents with a chief complaint of sob  COVID-19 + (30 Mar 2020 04:00)      HPI:  74M PMHx heart transplant , HTN, DM2, CKD, HLD, COPD, hypothyroidism recently admitted for cough and SOB diagnosed with COVID-19 discharged in stable condition presents ER for evaluation of worsening SOB. Pt currenlty intubated and sedated. Per ED Pt had worsening respiratory effort, cough, fatigue, unable to speak full sentences. Per Patient's Cardiac Transplant Physician: Dr. Donnelly: [phone] requesting that we stop Cellcept. (30 Mar 2020 04:00), admitted to ICU, now on versed , no other drips, worsening status      PAST MEDICAL & SURGICAL HISTORY:  COPD without exacerbation  Kidney disease  Hypothyroidism  Diabetes  HTN (hypertension)  H/O heart transplant      SOCIAL HX:   Smoking    uto    FAMILY HISTORY: uto      REVIEW OF SYSTEMS uto          Allergies    No Known Allergies    Intolerances        atorvastatin 40 milliGRAM(s) Oral at bedtime  azithromycin  IVPB 500 milliGRAM(s) IV Intermittent every 24 hours  chlorhexidine 4% Liquid 1 Application(s) Topical <User Schedule>  etomidate Injectable 20 milliGRAM(s) IV Push once  everolimus (ZORTRESS) 1 milliGRAM(s) Oral two times a day  fenofibrate Tablet 48 milliGRAM(s) Oral daily  fentaNYL   Infusion 0.5 MICROgram(s)/kG/Hr IV Continuous <Continuous>  folic acid 1 milliGRAM(s) Oral daily  heparin  Injectable 5000 Unit(s) SubCutaneous every 8 hours  hydroxychloroquine   Oral   hydroxychloroquine 400 milliGRAM(s) Oral every 12 hours  levothyroxine 25 MICROGram(s) Oral daily  midazolam Infusion 0.02 mG/kG/Hr IV Continuous <Continuous>  pentoxifylline 400 milliGRAM(s) Oral two times a day  pentoxifylline 400 milliGRAM(s) Oral at bedtime  rocuronium Injectable 100 milliGRAM(s) IV Push once  sodium bicarbonate  Infusion 0.173 mEq/kG/Hr IV Continuous <Continuous>  sodium bicarbonate  Injectable 150 milliEquivalent(s) IV Push once  : Home Meds:      PHYSICAL EXAM    ICU Vital Signs Last 24 Hrs  T(C): 36.7 (29 Mar 2020 23:30), Max: 36.7 (29 Mar 2020 23:30)  T(F): 98 (29 Mar 2020 23:30), Max: 98 (29 Mar 2020 23:30)  HR: 112 (30 Mar 2020 05:15) (112 - 142)  BP: 133/60 (30 Mar 2020 05:15) (112/59 - 133/60)  BP(mean): 87 (30 Mar 2020 05:15) (81 - 87)  RR: 18 (30 Mar 2020 05:15) (18 - 28)  SpO2: 95% (30 Mar 2020 05:15) (80% - 99%)      General: sedated, agonal breathing  HEENT:  JEY              Lymph Nodes: No cervical LN   Lungs: Bilateral BS, rhonchi  Cardiovascular: Regular  Abdomen: Soft, Positive BS  Extremities: No clubbing  Skin: Warm  Neurological: sedated        LABS:                          11.4   7.67  )-----------( 198      ( 29 Mar 2020 23:45 )             35.7                                               03-29    133<L>  |  101  |  62<HH>  ----------------------------<  170<H>  4.1   |  12<L>  |  4.5<HH>    Ca    7.4<L>      29 Mar 2020 23:45    TPro  5.9<L>  /  Alb  3.4<L>  /  TBili  <0.2  /  DBili  x   /  AST  71<H>  /  ALT  34  /  AlkPhos  37  03-29      PT/INR - ( 29 Mar 2020 23:45 )   PT: 14.80 sec;   INR: 1.29 ratio         PTT - ( 29 Mar 2020 23:45 )  PTT:33.1 sec                                                                                     LIVER FUNCTIONS - ( 29 Mar 2020 23:45 )  Alb: 3.4 g/dL / Pro: 5.9 g/dL / ALK PHOS: 37 U/L / ALT: 34 U/L / AST: 71 U/L / GGT: x                                                                                               Mode: AC/ CMV (Assist Control/ Continuous Mandatory Ventilation)  RR (machine): 18  TV (machine): 400  FiO2: 100  PEEP: 5  ITime: 1  MAP: 10  PIP: 24                                      ABG - ( 30 Mar 2020 05:50 )  pH, Arterial: 7.06  pH, Blood: x     /  pCO2: 48    /  pO2: 94    / HCO3: 14    / Base Excess: -16.2 /  SaO2: 93        plateau 18    DP 13      X-Rays    reviewed    MEDICATIONS  (STANDING):  atorvastatin 40 milliGRAM(s) Oral at bedtime  azithromycin  IVPB 500 milliGRAM(s) IV Intermittent every 24 hours  chlorhexidine 4% Liquid 1 Application(s) Topical <User Schedule>  etomidate Injectable 20 milliGRAM(s) IV Push once  everolimus (ZORTRESS) 1 milliGRAM(s) Oral two times a day  fenofibrate Tablet 48 milliGRAM(s) Oral daily  fentaNYL   Infusion 0.5 MICROgram(s)/kG/Hr (3.25 mL/Hr) IV Continuous <Continuous>  folic acid 1 milliGRAM(s) Oral daily  heparin  Injectable 5000 Unit(s) SubCutaneous every 8 hours  hydroxychloroquine   Oral   hydroxychloroquine 400 milliGRAM(s) Oral every 12 hours  levothyroxine 25 MICROGram(s) Oral daily  midazolam Infusion 0.02 mG/kG/Hr (1.36 mL/Hr) IV Continuous <Continuous>  pentoxifylline 400 milliGRAM(s) Oral two times a day  pentoxifylline 400 milliGRAM(s) Oral at bedtime  rocuronium Injectable 100 milliGRAM(s) IV Push once  sodium bicarbonate  Infusion 0.173 mEq/kG/Hr (75 mL/Hr) IV Continuous <Continuous>  sodium bicarbonate  Injectable 150 milliEquivalent(s) IV Push once    MEDICATIONS  (PRN):

## 2020-03-30 NOTE — ED PROVIDER NOTE - OBJECTIVE STATEMENT
74M h/o cardiac transplant 1 year ago on cellcept and prograft, CKD stage 4, HTN, hypothyroidism p/w respiratory distress. COVID (+) admitted and discharged yesterday. Pt is coming in with worsening respiratory effort, cough, fatigue. Pt unable to speak full sentences. Lethargic, unable to eat or drink due to SOB. Denies Cp, abd pain, n/v/d.

## 2020-03-30 NOTE — CONSULT NOTE ADULT - ATTENDING COMMENTS
Palliative care will be available for GOC as appropriate and as family available.  We will continue to follow.

## 2020-03-30 NOTE — ED PROVIDER NOTE - CLINICAL SUMMARY MEDICAL DECISION MAKING FREE TEXT BOX
I personally evaluated the patient. I reviewed the Resident’s or Physician Assistant’s note (as assigned above), and agree with the findings and plan except as documented in my note. Patient accepted to icu, treated for septic shock and covid, intubated, + central line in place

## 2020-03-30 NOTE — ED PROVIDER NOTE - PROGRESS NOTE DETAILS
Daughter is a Nurse at [employer]: April: [phone]  patient's Cardiac Transplant Physician: Dr. Donnelly: [phone]- requesting that we stop Celcept AA: Will admit to ICU. XR noted, TLC in place.

## 2020-03-30 NOTE — ED ADULT NURSE NOTE - NSIMPLEMENTINTERV_GEN_ALL_ED
Implemented All Fall with Harm Risk Interventions:  [place] to call system. Call bell, personal items and telephone within reach. Instruct patient to call for assistance. Room bathroom lighting operational. Non-slip footwear when patient is off stretcher. Physically safe environment: no spills, clutter or unnecessary equipment. Stretcher in lowest position, wheels locked, appropriate side rails in place. Provide visual cue, wrist band, yellow gown, etc. Monitor gait and stability. Monitor for mental status changes and reorient to person, place, and time. Review medications for side effects contributing to fall risk. Reinforce activity limits and safety measures with patient and family. Provide visual clues: red socks.

## 2020-03-30 NOTE — CONSULT NOTE ADULT - SUBJECTIVE AND OBJECTIVE BOX
REQUESTED OF:     Chart reviewed, Hospital Day 1    TANK RODRIGUEZ 74yMale  HPI:  74M PMHx heart transplant, HTN, DM2, CKD, HLD, COPD, hypothyroidism recently admitted for cough and SOB diagnosed with COVID-19 discharged in stable condition presents ER for evaluation of worsening SOB. Pt currenlty intubated and sedated. Per ED Pt had worsening respiratory effort, cough, fatigue, unable to speak full sentences. Per Patient's Cardiac Transplant Physician: Dr. Donnelly: [phone]- requesting that we stop Cellcept. (30 Mar 2020 04:00)    nephro - input appreciated; no acute indication for RRT    PAST MEDICAL & SURGICAL HISTORY:  COPD without exacerbation  Kidney disease  Hypothyroidism  Diabetes  HTN (hypertension)  H/O heart transplant      Subjective and Objective:  Today,  Discussed with     Focused Palliative Care Evaluation:                   Symptoms:                                      Pain                                     Dyspnea                                     N/V                                     Appetite                                     Anxiety                                     Other _____________________                     Support Devices:              PHYSICAL EXAM:      Constitutional:    Eyes:    ENMT:    Neck:    Breasts:    Back:    Respiratory:    Cardiovascular:    Gastrointestinal:    Genitourinary:    Rectal:    Extremities:    Vascular:    Neurological:    Skin:    Lymph Nodes:    Musculoskeletal:    Psychiatric:        T(C): 35.8, Max: 36.7 (23:30)  HR: 87 (87 - 142)  BP: 115/55 (112/59 - 133/60)  RR: 16 (16 - 28)  SpO2: 97% (80% - 99%)      LABS/STUDIES:  03-29    133<L>  |  101  |  62<HH>  ----------------------------<  170<H>  4.1   |  12<L>  |  4.5<HH>    Ca    7.4<L>      29 Mar 2020 23:45    TPro  5.9<L>  /  Alb  3.4<L>  /  TBili  <0.2  /  DBili  x   /  AST  71<H>  /  ALT  34  /  AlkPhos  37  03-29                            11.4   7.67  )-----------( 198      ( 29 Mar 2020 23:45 )             35.7       MEDICATIONS  (STANDING):  atorvastatin 40 milliGRAM(s) Oral at bedtime  chlorhexidine 4% Liquid 1 Application(s) Topical <User Schedule>  dextrose 5% 1000 milliLiter(s) (100 mL/Hr) IV Continuous <Continuous>  etomidate Injectable 20 milliGRAM(s) IV Push once  fenofibrate Tablet 48 milliGRAM(s) Oral daily  folic acid 1 milliGRAM(s) Oral daily  heparin  Injectable 5000 Unit(s) SubCutaneous every 8 hours  hydrocortisone sodium succinate Injectable 100 milliGRAM(s) IV Push every 8 hours  hydroxychloroquine   Oral   hydroxychloroquine 400 milliGRAM(s) Oral every 12 hours  levothyroxine 25 MICROGram(s) Oral daily  midazolam Infusion 0.044 mG/kG/Hr (3 mL/Hr) IV Continuous <Continuous>  mycophenolate mofetil 500 milliGRAM(s) Oral two times a day  pentoxifylline 400 milliGRAM(s) Oral at bedtime  pentoxifylline 400 milliGRAM(s) Oral two times a day  rocuronium Injectable 100 milliGRAM(s) IV Push once  senna 2 Tablet(s) Oral at bedtime    MEDICATIONS  (PRN):  morphine  - Injectable 2 milliGRAM(s) IV Push once PRN marcio          iStop:         PPS  Level    __________       Note PPS = Palliative Performance Scale; (c)2001, [employer]       Range from 100% meaning Full ambulation/self-care/intake/Level of Consciousness                                                                              to        10% meaning Bedbound/Unable to do any activity/extensive disease /Total Care/ No PO intake/ LOC=Full/drowsy/+/-confusion        (0% = death)                     Prior to acute illness, patient's functionality reportedly REQUESTED OF:     Chart reviewed, Hospital Day 1    TANK RODRIGUEZ 74yMale  HPI:  74M PMHx heart transplant, HTN, DM2, CKD, HLD, COPD, hypothyroidism recently admitted for cough and SOB diagnosed with COVID-19 discharged in stable condition presents ER for evaluation of worsening SOB. Pt currenlty intubated and sedated. Per ED Pt had worsening respiratory effort, cough, fatigue, unable to speak full sentences. Per Patient's Cardiac Transplant Physician: Dr. Donnelly: [phone]- requesting that we stop Cellcept. (30 Mar 2020 04:00)    nephro - input appreciated; no acute indication for RRT    PAST MEDICAL & SURGICAL HISTORY:  COPD without exacerbation  Kidney disease  Hypothyroidism  Diabetes  HTN (hypertension)  H/O heart transplant      Subjective and Objective:  Today,  Discussed with Dr. Arellano x two today    Focused Palliative Care Evaluation:                   Symptoms: sedated and vented                                     Pain                                     Dyspnea                                     N/V                                     Appetite                                     Anxiety                                     Other _____________________                     Support Devices:              PHYSICAL EXAM:    vented    T(C): 35.8, Max: 36.7 (23:30)  HR: 87 (87 - 142)  BP: 115/55 (112/59 - 133/60)  RR: 16 (16 - 28)  SpO2: 97% (80% - 99%)      LABS/STUDIES:  03-29    133<L>  |  101  |  62<HH>  ----------------------------<  170<H>  4.1   |  12<L>  |  4.5<HH>    Ca    7.4<L>      29 Mar 2020 23:45    TPro  5.9<L>  /  Alb  3.4<L>  /  TBili  <0.2  /  DBili  x   /  AST  71<H>  /  ALT  34  /  AlkPhos  37  03-29                            11.4   7.67  )-----------( 198      ( 29 Mar 2020 23:45 )             35.7   EXAM:  XR CHEST PORTABLE URGENT 1V            PROCEDURE DATE:  03/30/2020            INTERPRETATION:  Clinical History / Reason for exam: Nasogastric tube.    Comparison : Chest radiograph 3/30/2020.    Technique/Positioning: AP view the chest.    Findings:    Support devices: Endotracheal tube with tip at the pratik. Left-sided central line with tip overlying the brachiocephalic vein.    Cardiac/mediastinum/hilum: Cardiomegaly.    Lung parenchyma/Pleura: Bilateral patchy airspace opacifications with somewhat improved aeration on the left upper lung.    Skeleton/soft tissues: Stable..    Impression:      Bilateral patchy airspace opacifications with somewhat improved aeration on the left upper lung.                  JEFF GOMEZ M.D., ATTENDING RADIOLOGIST  This document has been electronically signed. Mar 30 2020  3:27PM                MEDICATIONS  (STANDING):  atorvastatin 40 milliGRAM(s) Oral at bedtime  chlorhexidine 4% Liquid 1 Application(s) Topical <User Schedule>  dextrose 5% 1000 milliLiter(s) (100 mL/Hr) IV Continuous <Continuous>  etomidate Injectable 20 milliGRAM(s) IV Push once  fenofibrate Tablet 48 milliGRAM(s) Oral daily  folic acid 1 milliGRAM(s) Oral daily  heparin  Injectable 5000 Unit(s) SubCutaneous every 8 hours  hydrocortisone sodium succinate Injectable 100 milliGRAM(s) IV Push every 8 hours  hydroxychloroquine   Oral   hydroxychloroquine 400 milliGRAM(s) Oral every 12 hours  levothyroxine 25 MICROGram(s) Oral daily  midazolam Infusion 0.044 mG/kG/Hr (3 mL/Hr) IV Continuous <Continuous>  mycophenolate mofetil 500 milliGRAM(s) Oral two times a day  pentoxifylline 400 milliGRAM(s) Oral at bedtime  pentoxifylline 400 milliGRAM(s) Oral two times a day  rocuronium Injectable 100 milliGRAM(s) IV Push once  senna 2 Tablet(s) Oral at bedtime    MEDICATIONS  (PRN):  morphine  - Injectable 2 milliGRAM(s) IV Push once PRN angelgesia                  PPS  Level    10 via reported findings       Note PPS = Palliative Performance Scale; (c)2001, [employer]       Range from 100% meaning Full ambulation/self-care/intake/Level of Consciousness                                                                              to        10% meaning Bedbound/Unable to do any activity/extensive disease /Total Care/ No PO intake/ LOC=Full/drowsy/+/-confusion        (0% = death)                     Prior to acute illness, patient's functionality reportedly

## 2020-03-30 NOTE — ED PROVIDER NOTE - PHYSICAL EXAMINATION
Constitutional: Unable to speak full sentences, dyspneic, hypoxic.  Head: Atraumatic.  Eyes: PERRLA. EOMI without discomfort.   ENT: No nasal discharge. Mucous membranes moist.  Neck: Supple. Painless ROM.  Cardiovascular: Regular rhythm. Regular rate. Normal S1 and S2. No murmurs. 2+ pulses in all extremities.   Pulmonary: Unable to speak full sentences, dyspneic, hypoxic. B/l rhonchi.   Abdominal: Soft. Nondistended. Nontender. No rebound or guarding.   Extremities. Pelvis stable. No lower extremity edema. Symmetric calves.  Skin: No rashes.   Neuro: AAOx3. No focal neurological deficits.

## 2020-03-30 NOTE — ED PROVIDER NOTE - NS_BEDUNITTYPES_ED_ALL_ED
Consent: Written consent was obtained and risks were reviewed including but not limited to scarring, infection, bleeding, scabbing, incomplete removal, nerve damage and allergy to anesthesia. ICU

## 2020-03-30 NOTE — ED PROVIDER NOTE - CARE PLAN
Principal Discharge DX:	Respiratory failure  Secondary Diagnosis:	CKD (chronic kidney disease)  Secondary Diagnosis:	Coronavirus infection

## 2020-03-30 NOTE — ED PROCEDURE NOTE - CPROC ED TRACHE INTUB DETAIL1
Patient was pre-oxygenated. An endotracheal tube (ETT) was placed through the vocal cords into the trachea.  ETT position was confirmed by auscultation of bilateral breath sounds to all lung fields. ETCO2 level was appropriate./Patient connected to ventilator with settings as ordered.
Nurse

## 2020-03-30 NOTE — H&P ADULT - ASSESSMENT
IMPRESSION:    Acute Respiratory Failure Secondary COVID-19  S/p Heart transplant  JUAN JOSE on CKD IV  H/o HLD/Hypothyroidism/DM2    PLAN:    CNS: Sedation vacation in AM. Versed and Fentanyl for now     HEENT:  Oral care    PULMONARY:  HOB @ 45 degrees. Serial ABG. Adjust as needed.      CARDIOVASCULAR: Holding Cellcept per Transplant. Cardio c/s     GI: GI prophylaxis            RENAL:  F/u  lytes.  Correct as needed. accurate I/O. Prograf level in AM. Renal c/s    INFECTIOUS DISEASE: Plaquenil 400 q12 and 200 q12 x 4 days. F/u inflammatory markers.     HEMATOLOGICAL:  DVT prophylaxis.    ENDOCRINE:  Follow up FS.  Insulin protocol if needed. Hydrocortisone 100q8 .     CODE STATUS: FULL CODE    DISPOSITION: Pt requires continued monitoring in the MICU IMPRESSION:    Acute Respiratory Failure Secondary COVID-19  S/p Heart transplant  JUAN JOSE on CKD IV  H/o HLD/Hypothyroidism/DM2    PLAN:    CNS: Sedation vacation in AM. Versed and Fentanyl for now     HEENT:  Oral care    PULMONARY:  HOB @ 45 degrees. Serial ABG. Adjust as needed.      CARDIOVASCULAR: Holding Cellcept per Transplant. Cardio c/s     GI: GI prophylaxis            RENAL:  F/u  lytes.  Correct as needed. accurate I/O. Prograf level in AM. Renal c/s. NS @ 75     INFECTIOUS DISEASE: Plaquenil 400 q12 and 200 q12 x 4 days. F/u inflammatory markers.     HEMATOLOGICAL:  DVT prophylaxis.    ENDOCRINE:  Follow up FS.  Insulin protocol if needed. Hydrocortisone 100q8 .     CODE STATUS: FULL CODE    DISPOSITION: Pt requires continued monitoring in the MICU IMPRESSION:    Acute Respiratory Failure Secondary COVID-19  S/p Heart transplant on Immunosuppression   JUAN JOSE on CKD IV  H/o HLD/Hypothyroidism/DM2    PLAN:    CNS: Sedation vacation in AM. Versed and Fentanyl for now     HEENT:  Oral care    PULMONARY:  HOB @ 45 degrees. Serial ABG. Adjust as needed.      CARDIOVASCULAR: Holding Cellcept per Transplant. Cardio c/s     GI: GI prophylaxis            RENAL:  F/u  lytes.  Correct as needed. accurate I/O. Prograf level in AM. Renal c/s. NS @ 75     INFECTIOUS DISEASE: Plaquenil 400 q12 and 200 q12 x 4 days. F/u inflammatory markers.     HEMATOLOGICAL:  DVT prophylaxis.    ENDOCRINE:  Follow up FS.  Insulin protocol if needed. Hydrocortisone 100q8 .     CODE STATUS: FULL CODE    DISPOSITION: Pt requires continued monitoring in the MICU

## 2020-03-30 NOTE — ED PROCEDURE NOTE - CPROC ED INFUS LINE DETAIL1
All lumen(s) aspirated and flushed without difficulty./Ultrasound guidance was used during placement./The location was identified, and the area was draped and prepped./The catheter was placed using sterile technique./The guidewire was recovered.

## 2020-03-30 NOTE — ED PROCEDURE NOTE - CPROC ED POST PROC CARE GUIDE1
Instructed patient/caregiver to follow-up with primary care physician./Verbal/written post procedure instructions were given to patient/caregiver./Instructed patient/caregiver regarding signs and symptoms of infection./Keep the cast/splint/dressing clean and dry.
Verbal/written post procedure instructions were given to patient/caregiver.

## 2020-03-30 NOTE — H&P ADULT - HISTORY OF PRESENT ILLNESS
74M PMHx heart transplant, HTN, DM2, CKD, HLD, COPD, hypothyroidism recently admitted for cough and SOB diagnosed with COVID-19 discharged in stable condition presents ER for evaluation of worsening SOB. Pt currenlty intubated and sedated. Per ED Pt had worsening respiratory effort, cough, fatigue, unable to speak full sentences. Per Patient's Cardiac Transplant Physician: Dr. Donnelly: [phone]- requesting that we stop Cellcept.

## 2020-03-31 LAB
ALBUMIN SERPL ELPH-MCNC: 1.9 G/DL — LOW (ref 3.5–5.2)
ALBUMIN SERPL ELPH-MCNC: 2.6 G/DL — LOW (ref 3.5–5.2)
ALBUMIN SERPL ELPH-MCNC: 2.7 G/DL — LOW (ref 3.5–5.2)
ALBUMIN SERPL ELPH-MCNC: 2.8 G/DL — LOW (ref 3.5–5.2)
ALP SERPL-CCNC: 25 U/L — LOW (ref 30–115)
ALP SERPL-CCNC: 41 U/L — SIGNIFICANT CHANGE UP (ref 30–115)
ALP SERPL-CCNC: 43 U/L — SIGNIFICANT CHANGE UP (ref 30–115)
ALP SERPL-CCNC: 48 U/L — SIGNIFICANT CHANGE UP (ref 30–115)
ALT FLD-CCNC: 16 U/L — SIGNIFICANT CHANGE UP (ref 0–41)
ALT FLD-CCNC: 25 U/L — SIGNIFICANT CHANGE UP (ref 0–41)
ALT FLD-CCNC: 26 U/L — SIGNIFICANT CHANGE UP (ref 0–41)
ALT FLD-CCNC: 29 U/L — SIGNIFICANT CHANGE UP (ref 0–41)
ANION GAP SERPL CALC-SCNC: 20 MMOL/L — HIGH (ref 7–14)
ANION GAP SERPL CALC-SCNC: 20 MMOL/L — HIGH (ref 7–14)
ANION GAP SERPL CALC-SCNC: 22 MMOL/L — HIGH (ref 7–14)
ANION GAP SERPL CALC-SCNC: SIGNIFICANT CHANGE UP MMOL/L (ref 7–14)
ANISOCYTOSIS BLD QL: SLIGHT — SIGNIFICANT CHANGE UP
APTT BLD: 36.6 SEC — SIGNIFICANT CHANGE UP (ref 27–39.2)
AST SERPL-CCNC: 36 U/L — SIGNIFICANT CHANGE UP (ref 0–41)
AST SERPL-CCNC: 58 U/L — HIGH (ref 0–41)
AST SERPL-CCNC: 60 U/L — HIGH (ref 0–41)
AST SERPL-CCNC: 62 U/L — HIGH (ref 0–41)
BASOPHILS # BLD AUTO: 0.01 K/UL — SIGNIFICANT CHANGE UP (ref 0–0.2)
BASOPHILS NFR BLD AUTO: 0.1 % — SIGNIFICANT CHANGE UP (ref 0–1)
BILIRUB SERPL-MCNC: <0.2 MG/DL — SIGNIFICANT CHANGE UP (ref 0.2–1.2)
BUN SERPL-MCNC: 57 MG/DL — HIGH (ref 10–20)
BUN SERPL-MCNC: 79 MG/DL — CRITICAL HIGH (ref 10–20)
BUN SERPL-MCNC: 82 MG/DL — CRITICAL HIGH (ref 10–20)
BUN SERPL-MCNC: 82 MG/DL — CRITICAL HIGH (ref 10–20)
CALCIUM SERPL-MCNC: 6.4 MG/DL — LOW (ref 8.5–10.1)
CALCIUM SERPL-MCNC: 6.7 MG/DL — LOW (ref 8.5–10.1)
CALCIUM SERPL-MCNC: 6.8 MG/DL — LOW (ref 8.5–10.1)
CALCIUM SERPL-MCNC: SIGNIFICANT CHANGE UP MG/DL (ref 8.5–10.1)
CHLORIDE SERPL-SCNC: 100 MMOL/L — SIGNIFICANT CHANGE UP (ref 98–110)
CHLORIDE SERPL-SCNC: 79 MMOL/L — LOW (ref 98–110)
CHLORIDE SERPL-SCNC: 97 MMOL/L — LOW (ref 98–110)
CHLORIDE SERPL-SCNC: 98 MMOL/L — SIGNIFICANT CHANGE UP (ref 98–110)
CK SERPL-CCNC: 665 U/L — HIGH (ref 0–225)
CO2 SERPL-SCNC: 22 MMOL/L — SIGNIFICANT CHANGE UP (ref 17–32)
CO2 SERPL-SCNC: 22 MMOL/L — SIGNIFICANT CHANGE UP (ref 17–32)
CO2 SERPL-SCNC: 23 MMOL/L — SIGNIFICANT CHANGE UP (ref 17–32)
CO2 SERPL-SCNC: SIGNIFICANT CHANGE UP MMOL/L (ref 17–32)
CREAT SERPL-MCNC: 4 MG/DL — HIGH (ref 0.7–1.5)
CREAT SERPL-MCNC: 6.1 MG/DL — CRITICAL HIGH (ref 0.7–1.5)
CREAT SERPL-MCNC: 6.5 MG/DL — CRITICAL HIGH (ref 0.7–1.5)
CREAT SERPL-MCNC: 6.9 MG/DL — CRITICAL HIGH (ref 0.7–1.5)
CRP SERPL-MCNC: 12.56 MG/DL — HIGH (ref 0–0.4)
CRP SERPL-MCNC: 12.75 MG/DL — HIGH (ref 0–0.4)
EOSINOPHIL # BLD AUTO: 0 K/UL — SIGNIFICANT CHANGE UP (ref 0–0.7)
EOSINOPHIL NFR BLD AUTO: 0 % — SIGNIFICANT CHANGE UP (ref 0–8)
FERRITIN SERPL-MCNC: 5422 NG/ML — HIGH (ref 30–400)
GIANT PLATELETS BLD QL SMEAR: PRESENT — SIGNIFICANT CHANGE UP
GLUCOSE BLDC GLUCOMTR-MCNC: 125 MG/DL — HIGH (ref 70–99)
GLUCOSE BLDC GLUCOMTR-MCNC: 157 MG/DL — HIGH (ref 70–99)
GLUCOSE BLDC GLUCOMTR-MCNC: 200 MG/DL — HIGH (ref 70–99)
GLUCOSE BLDC GLUCOMTR-MCNC: 258 MG/DL — HIGH (ref 70–99)
GLUCOSE BLDC GLUCOMTR-MCNC: 382 MG/DL — HIGH (ref 70–99)
GLUCOSE BLDC GLUCOMTR-MCNC: 383 MG/DL — HIGH (ref 70–99)
GLUCOSE BLDC GLUCOMTR-MCNC: 418 MG/DL — HIGH (ref 70–99)
GLUCOSE BLDC GLUCOMTR-MCNC: 89 MG/DL — SIGNIFICANT CHANGE UP (ref 70–99)
GLUCOSE SERPL-MCNC: 144 MG/DL — HIGH (ref 70–99)
GLUCOSE SERPL-MCNC: 172 MG/DL — HIGH (ref 70–99)
GLUCOSE SERPL-MCNC: 279 MG/DL — HIGH (ref 70–99)
GLUCOSE SERPL-MCNC: SIGNIFICANT CHANGE UP MG/DL (ref 70–99)
HCT VFR BLD CALC: 25.1 % — LOW (ref 42–52)
HGB BLD-MCNC: 8.1 G/DL — LOW (ref 14–18)
IMM GRANULOCYTES NFR BLD AUTO: 4.4 % — HIGH (ref 0.1–0.3)
INR BLD: 1.23 RATIO — SIGNIFICANT CHANGE UP (ref 0.65–1.3)
LDH SERPL L TO P-CCNC: 445 U/L — HIGH (ref 50–242)
LDH SERPL L TO P-CCNC: 693 U/L — HIGH (ref 50–242)
LDH SERPL L TO P-CCNC: 798 U/L — HIGH (ref 50–242)
LYMPHOCYTES # BLD AUTO: 0.12 K/UL — LOW (ref 1.2–3.4)
LYMPHOCYTES # BLD AUTO: 1.3 % — LOW (ref 20.5–51.1)
MAGNESIUM SERPL-MCNC: 1.5 MG/DL — LOW (ref 1.8–2.4)
MANUAL SMEAR VERIFICATION: SIGNIFICANT CHANGE UP
MCHC RBC-ENTMCNC: 29.5 PG — SIGNIFICANT CHANGE UP (ref 27–31)
MCHC RBC-ENTMCNC: 32.3 G/DL — SIGNIFICANT CHANGE UP (ref 32–37)
MCV RBC AUTO: 91.3 FL — SIGNIFICANT CHANGE UP (ref 80–94)
MICROCYTES BLD QL: SLIGHT — SIGNIFICANT CHANGE UP
MONOCYTES # BLD AUTO: 0.37 K/UL — SIGNIFICANT CHANGE UP (ref 0.1–0.6)
MONOCYTES NFR BLD AUTO: 4 % — SIGNIFICANT CHANGE UP (ref 1.7–9.3)
NEUTROPHILS # BLD AUTO: 8.41 K/UL — HIGH (ref 1.4–6.5)
NEUTROPHILS NFR BLD AUTO: 90.2 % — HIGH (ref 42.2–75.2)
NEUTS BAND # BLD: 3.5 % — SIGNIFICANT CHANGE UP (ref 0–6)
NRBC # BLD: 0 /100 WBCS — SIGNIFICANT CHANGE UP (ref 0–0)
NT-PROBNP SERPL-SCNC: 3073 PG/ML — HIGH (ref 0–300)
PHOSPHATE SERPL-MCNC: 4.3 MG/DL — SIGNIFICANT CHANGE UP (ref 2.1–4.9)
PLAT MORPH BLD: NORMAL — SIGNIFICANT CHANGE UP
PLATELET # BLD AUTO: 122 K/UL — LOW (ref 130–400)
POTASSIUM SERPL-MCNC: 3.1 MMOL/L — LOW (ref 3.5–5)
POTASSIUM SERPL-MCNC: 4.2 MMOL/L — SIGNIFICANT CHANGE UP (ref 3.5–5)
POTASSIUM SERPL-MCNC: 4.2 MMOL/L — SIGNIFICANT CHANGE UP (ref 3.5–5)
POTASSIUM SERPL-MCNC: 4.5 MMOL/L — SIGNIFICANT CHANGE UP (ref 3.5–5)
POTASSIUM SERPL-SCNC: 3.1 MMOL/L — LOW (ref 3.5–5)
POTASSIUM SERPL-SCNC: 4.2 MMOL/L — SIGNIFICANT CHANGE UP (ref 3.5–5)
POTASSIUM SERPL-SCNC: 4.2 MMOL/L — SIGNIFICANT CHANGE UP (ref 3.5–5)
POTASSIUM SERPL-SCNC: 4.5 MMOL/L — SIGNIFICANT CHANGE UP (ref 3.5–5)
PROCALCITONIN SERPL-MCNC: 11.2 NG/ML — HIGH (ref 0.02–0.1)
PROCALCITONIN SERPL-MCNC: 11.4 NG/ML — HIGH (ref 0.02–0.1)
PROT SERPL-MCNC: 3 G/DL — LOW (ref 6–8)
PROT SERPL-MCNC: 4.6 G/DL — LOW (ref 6–8)
PROT SERPL-MCNC: 5 G/DL — LOW (ref 6–8)
PROT SERPL-MCNC: 5.1 G/DL — LOW (ref 6–8)
PROTHROM AB SERPL-ACNC: 14.1 SEC — HIGH (ref 9.95–12.87)
RBC # BLD: 2.75 M/UL — LOW (ref 4.7–6.1)
RBC # FLD: 14.6 % — HIGH (ref 11.5–14.5)
RBC BLD AUTO: NORMAL — SIGNIFICANT CHANGE UP
SODIUM SERPL-SCNC: 139 MMOL/L — SIGNIFICANT CHANGE UP (ref 135–146)
SODIUM SERPL-SCNC: 140 MMOL/L — SIGNIFICANT CHANGE UP (ref 135–146)
SODIUM SERPL-SCNC: 142 MMOL/L — SIGNIFICANT CHANGE UP (ref 135–146)
SODIUM SERPL-SCNC: 142 MMOL/L — SIGNIFICANT CHANGE UP (ref 135–146)
WBC # BLD: 9.32 K/UL — SIGNIFICANT CHANGE UP (ref 4.8–10.8)
WBC # FLD AUTO: 9.32 K/UL — SIGNIFICANT CHANGE UP (ref 4.8–10.8)

## 2020-03-31 PROCEDURE — 71045 X-RAY EXAM CHEST 1 VIEW: CPT | Mod: 26,77

## 2020-03-31 PROCEDURE — 99231 SBSQ HOSP IP/OBS SF/LOW 25: CPT

## 2020-03-31 PROCEDURE — 71045 X-RAY EXAM CHEST 1 VIEW: CPT | Mod: 26

## 2020-03-31 PROCEDURE — 99233 SBSQ HOSP IP/OBS HIGH 50: CPT

## 2020-03-31 PROCEDURE — 99497 ADVNCD CARE PLAN 30 MIN: CPT | Mod: 25

## 2020-03-31 RX ORDER — SODIUM CHLORIDE 9 MG/ML
1000 INJECTION, SOLUTION INTRAVENOUS
Refills: 0 | Status: DISCONTINUED | OUTPATIENT
Start: 2020-03-31 | End: 2020-04-01

## 2020-03-31 RX ORDER — INSULIN HUMAN 100 [IU]/ML
10 INJECTION, SOLUTION SUBCUTANEOUS ONCE
Refills: 0 | Status: COMPLETED | OUTPATIENT
Start: 2020-03-31 | End: 2020-03-31

## 2020-03-31 RX ORDER — DEXTROSE 50 % IN WATER 50 %
12.5 SYRINGE (ML) INTRAVENOUS ONCE
Refills: 0 | Status: DISCONTINUED | OUTPATIENT
Start: 2020-03-31 | End: 2020-04-02

## 2020-03-31 RX ORDER — DEXTROSE 50 % IN WATER 50 %
25 SYRINGE (ML) INTRAVENOUS ONCE
Refills: 0 | Status: DISCONTINUED | OUTPATIENT
Start: 2020-03-31 | End: 2020-04-02

## 2020-03-31 RX ORDER — GLUCAGON INJECTION, SOLUTION 0.5 MG/.1ML
1 INJECTION, SOLUTION SUBCUTANEOUS ONCE
Refills: 0 | Status: DISCONTINUED | OUTPATIENT
Start: 2020-03-31 | End: 2020-04-02

## 2020-03-31 RX ORDER — INSULIN HUMAN 100 [IU]/ML
10 INJECTION, SOLUTION SUBCUTANEOUS ONCE
Refills: 0 | Status: DISCONTINUED | OUTPATIENT
Start: 2020-03-31 | End: 2020-03-31

## 2020-03-31 RX ORDER — DEXTROSE 50 % IN WATER 50 %
15 SYRINGE (ML) INTRAVENOUS ONCE
Refills: 0 | Status: DISCONTINUED | OUTPATIENT
Start: 2020-03-31 | End: 2020-04-02

## 2020-03-31 RX ORDER — ROCURONIUM BROMIDE 10 MG/ML
100 VIAL (ML) INTRAVENOUS ONCE
Refills: 0 | Status: DISCONTINUED | OUTPATIENT
Start: 2020-03-31 | End: 2020-03-31

## 2020-03-31 RX ORDER — DEXMEDETOMIDINE HYDROCHLORIDE IN 0.9% SODIUM CHLORIDE 4 UG/ML
0.3 INJECTION INTRAVENOUS
Qty: 200 | Refills: 0 | Status: DISCONTINUED | OUTPATIENT
Start: 2020-03-31 | End: 2020-03-31

## 2020-03-31 RX ORDER — ROCURONIUM BROMIDE 10 MG/ML
65 VIAL (ML) INTRAVENOUS ONCE
Refills: 0 | Status: DISCONTINUED | OUTPATIENT
Start: 2020-03-31 | End: 2020-03-31

## 2020-03-31 RX ORDER — INSULIN LISPRO 100/ML
5 VIAL (ML) SUBCUTANEOUS ONCE
Refills: 0 | Status: COMPLETED | OUTPATIENT
Start: 2020-03-31 | End: 2020-03-31

## 2020-03-31 RX ORDER — ROCURONIUM BROMIDE 10 MG/ML
100 VIAL (ML) INTRAVENOUS ONCE
Refills: 0 | Status: COMPLETED | OUTPATIENT
Start: 2020-03-31 | End: 2020-03-31

## 2020-03-31 RX ORDER — SODIUM CHLORIDE 9 MG/ML
1000 INJECTION, SOLUTION INTRAVENOUS
Refills: 0 | Status: DISCONTINUED | OUTPATIENT
Start: 2020-03-31 | End: 2020-04-02

## 2020-03-31 RX ORDER — CISATRACURIUM BESYLATE 2 MG/ML
3 INJECTION INTRAVENOUS
Qty: 200 | Refills: 0 | Status: DISCONTINUED | OUTPATIENT
Start: 2020-03-31 | End: 2020-03-31

## 2020-03-31 RX ORDER — INSULIN LISPRO 100/ML
VIAL (ML) SUBCUTANEOUS EVERY 6 HOURS
Refills: 0 | Status: DISCONTINUED | OUTPATIENT
Start: 2020-03-31 | End: 2020-04-01

## 2020-03-31 RX ADMIN — MYCOPHENOLATE MOFETIL 500 MILLIGRAM(S): 250 CAPSULE ORAL at 17:59

## 2020-03-31 RX ADMIN — Medication 200 MILLIGRAM(S): at 17:59

## 2020-03-31 RX ADMIN — Medication 5: at 23:50

## 2020-03-31 RX ADMIN — Medication 100 MILLIGRAM(S): at 05:48

## 2020-03-31 RX ADMIN — Medication 400 MILLIGRAM(S): at 21:41

## 2020-03-31 RX ADMIN — INSULIN HUMAN 10 UNIT(S): 100 INJECTION, SOLUTION SUBCUTANEOUS at 17:10

## 2020-03-31 RX ADMIN — Medication 25 MICROGRAM(S): at 05:49

## 2020-03-31 RX ADMIN — HEPARIN SODIUM 5000 UNIT(S): 5000 INJECTION INTRAVENOUS; SUBCUTANEOUS at 14:18

## 2020-03-31 RX ADMIN — SODIUM CHLORIDE 125 MILLILITER(S): 9 INJECTION, SOLUTION INTRAVENOUS at 21:39

## 2020-03-31 RX ADMIN — Medication 100 MILLIGRAM(S): at 10:42

## 2020-03-31 RX ADMIN — Medication 400 MILLIGRAM(S): at 17:59

## 2020-03-31 RX ADMIN — HEPARIN SODIUM 5000 UNIT(S): 5000 INJECTION INTRAVENOUS; SUBCUTANEOUS at 21:40

## 2020-03-31 RX ADMIN — Medication 100 MILLIGRAM(S): at 00:00

## 2020-03-31 RX ADMIN — Medication 60 MILLIGRAM(S): at 18:01

## 2020-03-31 RX ADMIN — Medication 5 UNIT(S): at 19:43

## 2020-03-31 RX ADMIN — Medication 5: at 19:43

## 2020-03-31 RX ADMIN — Medication 1 MILLIGRAM(S): at 12:27

## 2020-03-31 RX ADMIN — MYCOPHENOLATE MOFETIL 500 MILLIGRAM(S): 250 CAPSULE ORAL at 05:51

## 2020-03-31 RX ADMIN — HEPARIN SODIUM 5000 UNIT(S): 5000 INJECTION INTRAVENOUS; SUBCUTANEOUS at 05:46

## 2020-03-31 RX ADMIN — Medication 48 MILLIGRAM(S): at 12:27

## 2020-03-31 RX ADMIN — SENNA PLUS 2 TABLET(S): 8.6 TABLET ORAL at 21:40

## 2020-03-31 RX ADMIN — Medication 200 MILLIGRAM(S): at 05:46

## 2020-03-31 RX ADMIN — ATORVASTATIN CALCIUM 40 MILLIGRAM(S): 80 TABLET, FILM COATED ORAL at 21:40

## 2020-03-31 RX ADMIN — Medication 100 MILLIGRAM(S): at 18:00

## 2020-03-31 NOTE — PROGRESS NOTE ADULT - ASSESSMENT
74M PMHx heart transplant (13 years ago) , HTN, DM2, CKD, HLD, COPD, hypothyroidism recently admitted for cough and SOB diagnosed with COVID-19 discharged in stable condition presents ER for evaluation of worsening SOB found to be in hypoxic respiratory failure s/p intubation.     IMPRESSION:  Acute Hypoxic Respiratory Failure/ Severe ARDS  COVID-19 PNA  Acute on Chronic Kidney Failure (CKD V)      PLAN:    CNS: Sedated on Propofol, versed, morphine pushes for pain, Rocuronium pushes for paralysis     HEENT: Oral care    PULMONARY:  HOB @ 45 degrees.  Vent changes to be adjusted post repeat ABG this afternoon    CARDIOVASCULAR: Continue 1/2 NS with bicarb drip, continue cellcept, holding evarolimus    GI: GI prophylaxis.  Started on tube feedingFeeding.  Bowel regimen (lactulose and senna)    RENAL: Worsening uremia, High Anion Gap metabolic acidosis, Renal following, may need RRT    INFECTIOUS DISEASE: BCX negative, c/w HCQ    HEMATOLOGICAL:  DVT prophylaxis.    ENDOCRINE: DKA protocol, insulin drip, c/w solumedrol 60 mg q12     CODE: full  Dispo: palliative care consulted, daughter agreeable to talk to palliative, prognosis made aware to family, MICU monitoring

## 2020-03-31 NOTE — PROGRESS NOTE ADULT - SUBJECTIVE AND OBJECTIVE BOX
74yMale with diagnosis: RESPIRATORY FAILURE;CKD;CORONAVIRUS INFECTION    per primary team - severe ARDS, poor overall prognosis; updated dgt, April who is an RN here      PHYSICAL EXAM deferred    T(C): , Max: 37.1 (20:20)  T(F): 95.5  HR: 100 (96 - 127)  BP: 104/58 (86/50 - 147/66)  RR: 26 (26 - 33)  SpO2: 99% (80% - 99%)              LABS:                          8.1    9.32  )-----------( 122      ( 31 Mar 2020 05:30 )             25.1                                                                                      03-31    140  |  97<L>  |  82<HH>  ----------------------------<  172<H>  4.2   |  23  |  6.9<HH>    Ca    6.4<L>      31 Mar 2020 08:30  Phos  TNP     03-31  Mg     TNP     03-31    TPro  4.6<L>  /  Alb  2.6<L>  /  TBili  <0.2  /  DBili  x   /  AST  58<H>  /  ALT  25  /  AlkPhos  41  03-31             MEDICATIONS  (STANDING):  atorvastatin 40 milliGRAM(s) Oral at bedtime  chlorhexidine 4% Liquid 1 Application(s) Topical <User Schedule>  etomidate Injectable 20 milliGRAM(s) IV Push once  fenofibrate Tablet 48 milliGRAM(s) Oral daily  folic acid 1 milliGRAM(s) Oral daily  heparin  Injectable 5000 Unit(s) SubCutaneous every 8 hours  hydroxychloroquine   Oral   hydroxychloroquine 200 milliGRAM(s) Oral every 12 hours  insulin regular Infusion 2 Unit(s)/Hr (2 mL/Hr) IV Continuous <Continuous>  levothyroxine 25 MICROGram(s) Oral daily  methylPREDNISolone sodium succinate Injectable 60 milliGRAM(s) IV Push every 12 hours  midazolam Infusion 0.15 mG/kG/Hr (10.2 mL/Hr) IV Continuous <Continuous>  mycophenolate mofetil 500 milliGRAM(s) Oral two times a day  norepinephrine Infusion 0.1 MICROgram(s)/kG/Min (6.09 mL/Hr) IV Continuous <Continuous>  pentoxifylline 400 milliGRAM(s) Oral at bedtime  pentoxifylline 400 milliGRAM(s) Oral two times a day  propofol Infusion 10 MICROgram(s)/kG/Min (3.9 mL/Hr) IV Continuous <Continuous>  rocuronium Injectable 100 milliGRAM(s) IV Push once  rocuronium Injectable 65 milliGRAM(s) IV Push once  senna 2 Tablet(s) Oral at bedtime  sodium chloride 0.45% 1000 milliLiter(s) (125 mL/Hr) IV Continuous <Continuous>    MEDICATIONS  (PRN):    COVID-19 Related Labs (last 5 days):  Prothrombin Time, Plasma: 14.10 sec (03-31-20 @ 08:30)  Activated Partial Thromboplastin Time: 36.6 sec (03-31-20 @ 08:30)  Lactate Dehydrogenase, Serum: 693 U/L (03-31-20 @ 08:30)  Lactate Dehydrogenase, Serum: 798 U/L (03-31-20 @ 07:25)  Creatine Kinase, Serum: 665 U/L (03-31-20 @ 07:25)  Lactate Dehydrogenase, Serum: TNP U/L (03-31-20 @ 05:30)  Auto Neutrophil #: 8.41 K/uL (03-31-20 @ 05:30)  Auto Lymphocyte #: 0.12 K/uL (03-31-20 @ 05:30)  D-Dimer Assay, Quantitative: 707 ng/mL DDU (03-30-20 @ 17:40)  C-Reactive Protein, Serum: 12.75 mg/dL (03-30-20 @ 11:36)  Ferritin, Serum: 5422 ng/mL (03-30-20 @ 11:36)  Procalcitonin, Serum: 11.20 ng/mL (03-30-20 @ 11:36)  Lactate Dehydrogenase, Serum: 671 U/L (03-30-20 @ 11:36)  Auto Neutrophil #: 8.17 K/uL (03-30-20 @ 11:36)  Auto Lymphocyte #: 0.31 K/uL (03-30-20 @ 11:36)  Procalcitonin, Serum: 11.40 ng/mL (03-30-20 @ 11:36)  Auto Neutrophil #: 6.86 K/uL (03-29-20 @ 23:45)  Auto Lymphocyte #: 0.20 K/uL (03-29-20 @ 23:45)  Activated Partial Thromboplastin Time: 33.1 sec (03-29-20 @ 23:45)  Prothrombin Time, Plasma: 14.80 sec (03-29-20 @ 23:45)

## 2020-03-31 NOTE — CONSULT NOTE ADULT - ASSESSMENT
ASSESSMENT  74y M admitted with RESPIRATORY FAILURE; CKD; CORONAVIRUS INFECTION    HPI:  74M PMHx heart transplant, HTN, DM2, CKD, HLD, COPD, hypothyroidism recently admitted for cough and SOB diagnosed with COVID-19 discharged in stable condition presents ER for evaluation of worsening SOB. Pt currenlty intubated and sedated. Per ED Pt had worsening respiratory effort, cough, fatigue, unable to speak full sentences. Per Patient's Cardiac Transplant Physician: Dr. Donnelly: [phone] requesting that we stop Cellcept.    PROBLEMS  Severe Sepsis (Pulse>90, Resp Rate>20), lactic acidosis due to COVID-19 Pneumonia    New problem with additional W/U   acute illness which poses threat to bodily function     3/30 Cxray Bilateral patchy airspace opacifications with somewhat improved aeration on the left upper lung.    On hydroxychloroquine 200 milliGRAM(s) Oral every 12 hours  3/30 QTC Calculation(Bezet) 444 ms      PLAN  Complete 8 dose course of hydroxychloroquine 200 milliGRAM(s) Oral every 12 hours  Palliative care F/U
IMPRESSION:    Acute hypoxic resp failure/ COVID +/ ARDS  Heart transplant  Acute on chronic renal  failure        PLAN:    CNS: versed, if needed morphine    HEENT:  Oral care    PULMONARY:  HOB @ 45 degrees, increase RR 24/ increase PEEP as per DP, keep plateau less than than 30    CARDIOVASCULAR: start bicarcb drip 1 l d5 w 3 amp 100 cc/h    GI: GI prophylaxis                                          Feeding NPO    RENAL:  F/u  lytes.  Correct as needed. accurate I/OM renal consult, repeat CMP    INFECTIOUS DISEASE: PLAQUENIL, INFLA MARKERS, CALL ID [email or web address]    HEMATOLOGICAL:  DVT prophylaxis.    ENDOCRINE:  Follow up FS.  Insulin protocol if needed. hydro 100 q 8    CODE STATUS: FULL CODE    DISPOSITION: Pt requires continued monitoring in the MICU  palliative care
74 year with h/o heart transplant 13 years ago, HTN, DM, CKD, HLD, COPD admitted for s/s and then +COVID; Plaq. day 2,   Primary team awaiting to meet with family. Wife is having symptoms of COVID; dgt is a nurse in hospital    Palliative Care will remain available for GOC in collaboration with or in addition to the primary teams.    [phone]
74M PMHx heart transplant (13 years ago) , HTN, DM2, CKD, HLD, COPD, hypothyroidism recently admitted for cough and SOB diagnosed with COVID-19 discharged in stable condition presents ER for evaluation of worsening SOB found to be in hypoxic respiratory failure s/p intubation.  # JUAN JOSE / stage 4 ckd , baseline creatinine in the 3 range  # i spoke with Dr pérez [phone] , d/c everolimus, resume cellcept 500 q 12, patient was on prednisone 2.5, please resume unless stress dose steroids needed   #start d5 with 3 amp of bicarbonate at 100 cc/h  #please document UO  # covid positive, intubated on HCQ  #check ph level  # no acute indication for RRT   # will follow

## 2020-03-31 NOTE — PROGRESS NOTE ADULT - ASSESSMENT
74 year old being reevaluated for goals of care in setting of COVID + infection with continued clinical decline. Labs, imaging and Critical Care input reviewed.    Prognosis: Poor    Current Code Status: Full    Symptoms to manage: intubated/sedated    Spoke to patient’s dgt, April    Palliative Care services introduced. She is familiar due to fact she was a float RN here. Clinical condition and prognosis reviewed. Explained that DNR futile. Educated about current treatment prolonging life. Family member verbalized understanding of this.    All questions answered in detail    Decision made: Family is having private meeting tonight    continue current management      Decided Code Status: Full Code    over 15 minutes spent discussing advanced care planning    Discussed with Primary Team    Recommendations:    continue current medical treatment including Full Code  Family to discuss in private tonight and update team in AM   contacted per family wishes    We will follow    [id number]

## 2020-03-31 NOTE — PROGRESS NOTE ADULT - ASSESSMENT
74M PMHx heart transplant (13 years ago) , HTN, DM2, CKD, HLD, COPD, hypothyroidism recently admitted for cough and SOB diagnosed with COVID-19 discharged in stable condition presents ER for evaluation of worsening SOB found to be in hypoxic respiratory failure s/p intubation.  # JUAN JOSE / stage 4 ckd , baseline creatinine in the 3 range  # ?ATN in nature   #  cc overnight  #change iv fluids to 1/2 ns with 75 meq of bicarbonate at 125 cc/h  # i spoke with Dr pérez [phone] , off everolimus, on  cellcept 500 q 12, on stress dose steroids   #start d5 with 3 amp of bicarbonate at 100 cc/h  # covid positive, intubated on HCQ  # check repeat labs, corrected calcium 8.4  # if no improvement will need HD

## 2020-03-31 NOTE — PROGRESS NOTE ADULT - SUBJECTIVE AND OBJECTIVE BOX
Patient is a 74y old  Male who presents with a chief complaint of Hypoxia   COVID-19 + (31 Mar 2020 08:54)    Intubated, sedated versed .15, propofol 25   Levophed .17    Over Night Events: desaturating to 70-80s     ROS:     CONSTITUTIONAL:   no fever   no chills.  no weight gain   no weight loss    EYES:   no discharge,   no pain  no redness,   no visual changes.    ENT:   ETT in place   Nose: no nasal congestion and no nasal drainage.  Mouth/Throat: no dysphagia,  no hoarseness and no throat pain.  Neck: no lumps, no pain, no stiffness and no swollen glands.     CARDIOVASCULAR:   no chest pain,   no swelling  no palpitaions  no syncope    RESPIRATORY:  no SOB,  no wheezing ,  no respiratory difficulty  no sputum production    GASTROINTESTINAL:   no abdominal pain,   no constipation,   no diarrhea,   no vomiting.    GENITOURINARY:  tipton in place   no dysuria,   no frequency,   no urgency  no hematuria.    MUSCULOSKELETAL:   no back pain,   no musculoskeletal pain,  no weakness.    SKIN:   no jaundice,   no lesions,   no pruritis,   no rashes.    NEURO:   responds to painful stimuli   no loss of consciousness,   no gait abnormality,   no headache,   no sensory deficits,   no weakness.    PSYCHIATRIC:   no known mental health issues  no anxiety  no depression    ALLERGIC/IMMUNOLOGIC:   No active allergic or immunologic issues        PHYSICAL EXAM    ICU Vital Signs Last 24 Hrs  T(C): 36.8 (31 Mar 2020 09:06), Max: 37.1 (30 Mar 2020 20:20)  T(F): 98.3 (31 Mar 2020 09:06), Max: 98.8 (31 Mar 2020 03:01)  HR: 98 (31 Mar 2020 09:06) (96 - 127)  BP: 90/50 (31 Mar 2020 09:18) (90/50 - 147/66)  BP(mean): 80 (31 Mar 2020 09:06) (80 - 86)  RR: 26 (31 Mar 2020 09:06) (26 - 33)  SpO2: 91% (31 Mar 2020 05:40) (80% - 98%)      CONSTITUTIONAL:   Ill appearing.  Well nourished.  NAD    ENT:   Airway patent,   Mouth with normal mucosa.   No thrush    EYES:   Pupils equal,   Round and reactive to light.    CARDIAC:   Normal rate,   Regular rhythm.    No edema      Vascular:  Normal systolic impulse  No Carotid bruits    RESPIRATORY:   No wheezing  Bilateral BS  Normal chest expansion  Not tachypneic,  No use of accessory muscles    GASTROINTESTINAL:  Abdomen soft,   Non-tender,   No guarding,   + BS    GENITOURINARY  normal genitalia for sex  no edema    MUSCULOSKELETAL:   Range of motion is not limited,  No muscle or joint tenderness  No clubbing, cyanosis    NEUROLOGICAL:   sedated, responds to painful stimuli -  No motor  deficits.  pertinent DTRs normal    SKIN:   Skin normal color for race,   Warm and dry and intact.   No evidence of rash.    PSYCHIATRIC:   Normal mood and affect.   No apparent risk to self or others.    HEME LYMPH:   No splenomegaly.  No cervical  lymphadenopathy.  no inguinal lymphadenopathy      03-30-20 @ 07:01  -  03-31-20 @ 07:00  --------------------------------------------------------  IN:    dextrose 5%: 1075 mL    insulin regular Infusion: 40 mL    midazolam Infusion: 107.8 mL    norepinephrine Infusion: 132.2 mL    propofol Infusion: 42.9 mL  Total IN: 1397.9 mL    OUT:    Indwelling Catheter - Urethral: 570 mL  Total OUT: 570 mL    Total NET: 827.9 mL          LABS:                            8.1    9.32  )-----------( 122      ( 31 Mar 2020 05:30 )             25.1                                               03-31    142  |  98  |  82<HH>  ----------------------------<  144<H>  4.5   |  22  |  6.5<HH>    Ca    6.7<L>      31 Mar 2020 07:25  Phos  TNP     03-31  Mg     TNP     03-31    TPro  5.0<L>  /  Alb  2.7<L>  /  TBili  <0.2  /  DBili  x   /  AST  62<H>  /  ALT  26  /  AlkPhos  48  03-31      PT/INR - ( 31 Mar 2020 08:30 )   PT: 14.10 sec;   INR: 1.23 ratio         PTT - ( 31 Mar 2020 08:30 )  PTT:36.6 sec                                           CARDIAC MARKERS ( 31 Mar 2020 07:25 )  x     / x     / 665 U/L / x     / x                                                LIVER FUNCTIONS - ( 31 Mar 2020 07:25 )  Alb: 2.7 g/dL / Pro: 5.0 g/dL / ALK PHOS: 48 U/L / ALT: 26 U/L / AST: 62 U/L / GGT: x                                                  Culture - Blood (collected 29 Mar 2020 23:45)  Source: .Blood Blood-Peripheral  Preliminary Report (31 Mar 2020 06:02):    No growth to date.    Culture - Blood (collected 29 Mar 2020 23:45)  Source: .Blood Blood-Peripheral  Preliminary Report (31 Mar 2020 06:02):    No growth to date.                                                   Mode: AC/ CMV (Assist Control/ Continuous Mandatory Ventilation)  RR (machine): 24  TV (machine): 400  FiO2: 100  PEEP: 20  ITime: 1  MAP: 15  PIP: 26                                      ABG - ( 31 Mar 2020 04:15 )  pH, Arterial: 7.29  pH, Blood: x     /  pCO2: 55    /  pO2: 50    / HCO3: 26    / Base Excess: -0.7  /  SaO2: 81                  MEDICATIONS  (STANDING):  atorvastatin 40 milliGRAM(s) Oral at bedtime  chlorhexidine 4% Liquid 1 Application(s) Topical <User Schedule>  etomidate Injectable 20 milliGRAM(s) IV Push once  fenofibrate Tablet 48 milliGRAM(s) Oral daily  folic acid 1 milliGRAM(s) Oral daily  heparin  Injectable 5000 Unit(s) SubCutaneous every 8 hours  hydrocortisone sodium succinate Injectable 100 milliGRAM(s) IV Push every 8 hours  hydroxychloroquine   Oral   hydroxychloroquine 200 milliGRAM(s) Oral every 12 hours  insulin regular Infusion 2 Unit(s)/Hr (2 mL/Hr) IV Continuous <Continuous>  levothyroxine 25 MICROGram(s) Oral daily  midazolam Infusion 0.15 mG/kG/Hr (10.2 mL/Hr) IV Continuous <Continuous>  mycophenolate mofetil 500 milliGRAM(s) Oral two times a day  norepinephrine Infusion 0.1 MICROgram(s)/kG/Min (6.09 mL/Hr) IV Continuous <Continuous>  pentoxifylline 400 milliGRAM(s) Oral at bedtime  pentoxifylline 400 milliGRAM(s) Oral two times a day  propofol Infusion 10 MICROgram(s)/kG/Min (3.9 mL/Hr) IV Continuous <Continuous>  rocuronium Injectable 100 milliGRAM(s) IV Push once  rocuronium Injectable 100 milliGRAM(s) IV Push once  senna 2 Tablet(s) Oral at bedtime  sodium chloride 0.45% 1000 milliLiter(s) (125 mL/Hr) IV Continuous <Continuous>    MEDICATIONS  (PRN):      New X-rays reviewed:                                                                                  ECHO    CXR interpreted by me:  bi;ateral central opacities

## 2020-03-31 NOTE — PROGRESS NOTE ADULT - ASSESSMENT
IMPRESSION:    Acute hypoxic resp failure/ COVID +/ ARDS  Heart transplant  Acute on chronic renal  failure  Severe ARDS     PLAN:    CNS: versed, propofol for sedation, add morphine pushes. Grey pushes prn for paralysis     HEENT:  Oral care    PULMONARY:  HOB @ 45 degrees, vent changes after paralysis. keep plateau less than than 30. Peep titration per DP     CARDIOVASCULAR: cont bicarcb drip 1 l d5 w 3 amp 100 cc/h. Holding evarolimus    GI: GI prophylaxis                        tube feeds     RENAL:  F/u  lytes.  Correct as needed. accurate I/OM renal f/u, repeat CMP    INFECTIOUS DISEASE: PLAQUENIL, crp, ddimer, procal, ID f/u     HEMATOLOGICAL:  DVT prophylaxis.    ENDOCRINE:  Follow up FS.  Insulin protocol if needed.  solumedrol 60 bid     CODE STATUS: FULL CODE    DISPOSITION: Pt requires continued monitoring in the MICU  palliative care  poor prognosis IMPRESSION:    Acute hypoxic resp failure/ COVID +/ ARDS  Heart transplant  Acute on chronic renal  failure  Severe ARDS     PLAN:    CNS: Assure adequate sedation.  Paralysis if needed      HEENT:  Oral care    PULMONARY:  HOB @ 45 degrees, vent changes after paralysis. keep plateau less than than 30. Peep titration per DP     CARDIOVASCULAR: Avoid volume overload     GI: GI prophylaxis                        tube feeds     RENAL:  F/u  lytes.  Correct as needed. accurate I/OM renal f/u, repeat CMP    INFECTIOUS DISEASE: PLAQUENIL, crp, ddimer, procal, ID f/u     HEMATOLOGICAL:  DVT prophylaxis.    ENDOCRINE:  Follow up FS.  Insulin protocol if needed.  Solumedrol  60 bid     CODE STATUS: FULL CODE    DISPOSITION: Pt requires continued monitoring in the MICU  palliative care  poor prognosis

## 2020-03-31 NOTE — CONSULT NOTE ADULT - SUBJECTIVE AND OBJECTIVE BOX
TANK RODRIGUEZ  74y, Male  Allergy: No Known Allergies      CHIEF COMPLAINT: Hypoxia   COVID-19 + (31 Mar 2020 06:38)      HPI:  74M PMHx heart transplant, HTN, DM2, CKD, HLD, COPD, hypothyroidism recently admitted for cough and SOB diagnosed with COVID-19 discharged in stable condition presents ER for evaluation of worsening SOB. Pt currenlty intubated and sedated. Per ED Pt had worsening respiratory effort, cough, fatigue, unable to speak full sentences. Per Patient's Cardiac Transplant Physician: Dr. Donnelly: [phone] requesting that we stop Cellcept. (30 Mar 2020 04:00)    FAMILY HISTORY:    PAST MEDICAL & SURGICAL HISTORY:  COPD without exacerbation  Kidney disease  Hypothyroidism  Diabetes  HTN (hypertension)  H/O heart transplant      SOCIAL HISTORY  Social History:  Ex smoker  	Does not drink alcohol  Lives with wife (30 Mar 2020 04:00)        ROS  HEENT: Denies headache, rhinorrhea, sore throat, eye pain  CV: Denies CP, palpitations  PULM: Denies SOB, cough  GI: Denies abdominal pain, diarrhea  : Denies dysuria, hematuria  MSK: Denies arthralgias  SKIN: Denies rash   NEURO: Denies paresthesias, weakness  PSYCH: Denies depression    VITALS:  T(F): 98.5, Max: 98.8 (03-31-20 @ 03:01)  HR: 107  BP: 130/60  RR: 26Vital Signs Last 24 Hrs  T(C): 36.9 (31 Mar 2020 05:40), Max: 37.1 (30 Mar 2020 20:20)  T(F): 98.5 (31 Mar 2020 05:40), Max: 98.8 (31 Mar 2020 03:01)  HR: 107 (31 Mar 2020 05:40) (96 - 127)  BP: 130/60 (31 Mar 2020 05:40) (125/60 - 147/66)  BP(mean): 86 (30 Mar 2020 23:00) (86 - 86)  RR: 26 (31 Mar 2020 05:40) (26 - 33)  SpO2: 91% (31 Mar 2020 05:40) (80% - 98%)    PHYSICAL EXAM:  see below    TESTS & MEASUREMENTS:                        8.1    9.32  )-----------( 122      ( 31 Mar 2020 05:30 )             25.1     03-31    TNP  |  TNP  |  TNP  ----------------------------<  TNP  TNP   |  TNP  |  TNP    Ca    TNP      31 Mar 2020 05:30  Phos  TNP     03-31  Mg     TNP     03-31    TPro  TNP  /  Alb  TNP  /  TBili  TNP  /  DBili  x   /  AST  TNP  /  ALT  TNP  /  AlkPhos  TNP  03-31    eGFR if Non African American: TNP mL/min/1.73M2 (03-31-20 @ 05:30)  eGFR if African American: TNP mL/min/1.73M2 (03-31-20 @ 05:30)  eGFR if Non African American: 8 mL/min/1.73M2 (03-30-20 @ 20:00)  eGFR if African American: 9 mL/min/1.73M2 (03-30-20 @ 20:00)  eGFR if Non African American: 9 mL/min/1.73M2 (03-30-20 @ 11:36)  eGFR if : 11 mL/min/1.73M2 (03-30-20 @ 11:36)    LIVER FUNCTIONS - ( 31 Mar 2020 05:30 )  Alb: TNP g/dL / Pro: TNP g/dL / ALK PHOS: TNP U/L / ALT: TNP U/L / AST: TNP U/L / GGT: x               Culture - Blood (collected 03-29-20 @ 23:45)  Source: .Blood Blood-Peripheral  Preliminary Report (03-31-20 @ 06:02):    No growth to date.    Culture - Blood (collected 03-29-20 @ 23:45)  Source: .Blood Blood-Peripheral  Preliminary Report (03-31-20 @ 06:02):    No growth to date.    GI PCR Panel, Stool (collected 03-26-20 @ 11:07)  Source: .Stool Feces  Final Report (03-27-20 @ 00:47):    GI PCR Results: NOT detected    *******Please Note:*******    GI panel PCR evaluates for:    Campylobacter, Plesiomonas shigelloides, Salmonella,    Vibrio, Yersinia enterocolitica, Enteroaggregative    Escherichia coli (EAEC), Enteropathogenic E.coli (EPEC),    Enterotoxigenic E. coli (ETEC) lt/st, Shiga-like    toxin-producing E. coli (STEC) stx1/stx2,    Shigella/ Enteroinvasive E. coli (EIEC), Cryptosporidium,    Cyclospora cayetanensis, Entamoeba histolytica,    Giardia lamblia, Adenovirus F 40/41, Astrovirus,    Norovirus GI/GII, Rotavirus A, Sapovirus    Culture - Blood (collected 03-24-20 @ 09:36)  Source: .Blood Blood  Final Report (03-29-20 @ 22:01):    No growth at 5 days.        Blood Gas Venous - Lactate: 2.7 mmoL/L (03-29-20 @ 23:39)      INFECTIOUS DISEASES TESTING      RADIOLOGY & ADDITIONAL TESTS:  I have personally reviewed the last Chest xray  CXR  Xray Chest 1 View- PORTABLE-Urgent:   EXAM:  XR CHEST PORTABLE URGENT 1V            PROCEDURE DATE:  03/30/2020            INTERPRETATION:  Clinical History / Reason for exam: Nasogastric tube.    Comparison : Chest radiograph 3/30/2020.    Technique/Positioning: AP view the chest.    Findings:    Support devices: Endotracheal tube with tip at the pratik. Left-sided central line with tip overlying the brachiocephalic vein.    Cardiac/mediastinum/hilum: Cardiomegaly.    Lung parenchyma/Pleura: Bilateral patchy airspace opacifications with somewhat improved aeration on the left upper lung.    Skeleton/soft tissues: Stable..    Impression:      Bilateral patchy airspace opacifications with somewhat improved aeration on the left upper lung.                  JEFF GOMEZ M.D., ATTENDING RADIOLOGIST  This document has been electronically signed. Mar 30 2020  3:27PM             (03-30-20 @ 14:22)  Xray Chest 1 View AP/PA:   EXAM:  XR CHEST FRONTAL 1V            PROCEDURE DATE:  03/30/2020            INTERPRETATION:  Clinical History / Reason for exam: Shortness of breath. Fever.    Comparison : Chest radiograph dated March 24, 2020.    Technique/Positioning: Portable frontal.    Findings:    Support devices: Endotracheal tube and left IJ central venous catheter are in appropriate position. Overlying EKG leads.    Cardiac/mediastinum/hilum: Stable.    Lung parenchyma/Pleura: Substantially increased bilateral airspace opacities, right greater than left. No pleural effusion or pneumothorax.    Skeleton/soft tissues: Stable.    Impression:      1. Substantially increased bilateral airspace opacities, right greater than left.    2. Lines and tubes as above.                  MEJIA PADRON M.D., ATTENDING RADIOLOGIST  This document has been electronically signed. Mar 30 2020  7:19AM             (03-30-20 @ 06:59)      CT      CARDIOLOGY TESTING  12 Lead ECG:   Ventricular Rate 132 BPM    Atrial Rate 132 BPM    P-R Interval 128 ms    QRS Duration 106 ms    Q-T Interval 300 ms    QTC Calculation(Bezet) 444 ms    P Axis 45 degrees    R Axis 65 degrees    T Axis 56 degrees    Diagnosis Line Sinus tachycardia  Cannot rule out Septal infarct , age undetermined  Abnormal ECG    Confirmed by Rere oDbbs MD ([id number]) on 3/30/2020 9:07:57 AM (03-30-20 @ 01:33)  12 Lead ECG:   Ventricular Rate 102 BPM    Atrial Rate 102 BPM    P-R Interval 146 ms    QRS Duration 110 ms    Q-T Interval 354 ms    QTC Calculation(Bezet) 461 ms    P Axis 26 degrees    R Axis 45 degrees    T Axis 57 degrees    Diagnosis Line Sinus tachycardia  Incomplete right bundle branch block  Borderline ECG    Confirmed by HARMAN DELGADO MD ([id number]) on 3/26/2020 12:18:56 PM (03-25-20 @ 15:25)      MEDICATIONS  atorvastatin 40  chlorhexidine 4% Liquid 1  etomidate Injectable 20  fenofibrate Tablet 48  folic acid 1  heparin  Injectable 5000  hydrocortisone sodium succinate Injectable 100  hydroxychloroquine   hydroxychloroquine 200  insulin regular Infusion 2  levothyroxine 25  midazolam Infusion 0.15  mycophenolate mofetil 500  norepinephrine Infusion 0.1  pentoxifylline 400  pentoxifylline 400  propofol Infusion 10  rocuronium Injectable 100  rocuronium Injectable 100  senna 2  sodium chloride 0.45% 1000      ANTIBIOTICS:  hydroxychloroquine   Oral   hydroxychloroquine 200 milliGRAM(s) Oral every 12 hours      All available historical data has been reviewed

## 2020-03-31 NOTE — PROGRESS NOTE ADULT - SUBJECTIVE AND OBJECTIVE BOX
seen and examined  intubated/ventilated  no pressors         PAST HISTORY  --------------------------------------------------------------------------------  No significant changes to PMH, PSH, FHx, SHx, unless otherwise noted    ALLERGIES & MEDICATIONS  --------------------------------------------------------------------------------  Allergies    No Known Allergies    Intolerances      Standing Inpatient Medications  atorvastatin 40 milliGRAM(s) Oral at bedtime  chlorhexidine 4% Liquid 1 Application(s) Topical <User Schedule>  dextrose 5% 1000 milliLiter(s) IV Continuous <Continuous>  etomidate Injectable 20 milliGRAM(s) IV Push once  fenofibrate Tablet 48 milliGRAM(s) Oral daily  folic acid 1 milliGRAM(s) Oral daily  heparin  Injectable 5000 Unit(s) SubCutaneous every 8 hours  hydrocortisone sodium succinate Injectable 100 milliGRAM(s) IV Push every 8 hours  hydroxychloroquine   Oral   hydroxychloroquine 200 milliGRAM(s) Oral every 12 hours  insulin regular Infusion 2 Unit(s)/Hr IV Continuous <Continuous>  levothyroxine 25 MICROGram(s) Oral daily  midazolam Infusion 0.15 mG/kG/Hr IV Continuous <Continuous>  mycophenolate mofetil 500 milliGRAM(s) Oral two times a day  norepinephrine Infusion 0.1 MICROgram(s)/kG/Min IV Continuous <Continuous>  pentoxifylline 400 milliGRAM(s) Oral at bedtime  pentoxifylline 400 milliGRAM(s) Oral two times a day  propofol Infusion 10 MICROgram(s)/kG/Min IV Continuous <Continuous>  rocuronium Injectable 100 milliGRAM(s) IV Push once  senna 2 Tablet(s) Oral at bedtime    PRN Inpatient Medications        VITALS/PHYSICAL EXAM  --------------------------------------------------------------------------------  T(C): 36.9 (03-31-20 @ 05:40), Max: 37.1 (03-30-20 @ 20:20)  HR: 107 (03-31-20 @ 05:40) (87 - 127)  BP: 130/60 (03-31-20 @ 05:40) (115/55 - 147/66)  RR: 26 (03-31-20 @ 05:40) (16 - 33)  SpO2: 91% (03-31-20 @ 05:40) (80% - 98%)  Wt(kg): --  Height (cm): 162.56 (03-29-20 @ 23:30)  Weight (kg): 65 (03-29-20 @ 23:30)  BMI (kg/m2): 24.6 (03-29-20 @ 23:30)  BSA (m2): 1.7 (03-29-20 @ 23:30)      03-30-20 @ 07:01  -  03-31-20 @ 06:38  --------------------------------------------------------  IN: 1397.9 mL / OUT: 570 mL / NET: 827.9 mL      Physical Exam:  	Gen: intubated/ventilated   	Pulm: B/L tolu   	CV:  S1S2; no rub  	Abd: +distended  	LE: no edema      LABS/STUDIES  --------------------------------------------------------------------------------              8.1    9.32  >-----------<  122      [03-31-20 @ 05:30]              25.1     TNP  |  TNP  |  TNP  ----------------------------<  x       [03-31-20 @ 05:30]  TNP   |  x   |  TNP        Ca     6.8     [03-30-20 @ 20:00]      Mg     TNP     [03-31-20 @ 05:30]      Phos  TNP     [03-31-20 @ 05:30]    TPro  TNP  /  Alb  TNP  /  TBili  TNP  /  DBili  x   /  AST  TNP  /  ALT  TNP  /  AlkPhos  TNP  [03-31-20 @ 05:30]    PT/INR: PT 14.80, INR 1.29       [03-29-20 @ 23:45]  PTT: 33.1       [03-29-20 @ 23:45]    LDH TNP      [03-31-20 @ 05:30]    Creatinine Trend:  SCr TNP [03-31 @ 05:30]  SCr 6.1 [03-30 @ 20:00]  SCr 5.4 [03-30 @ 11:36]  SCr 4.5 [03-29 @ 23:45]  SCr 4.1 [03-26 @ 08:43]        Ferritin 5422      [03-30-20 @ 11:36]  HbA1c 7.1      [03-25-20 @ 06:49]

## 2020-03-31 NOTE — PROGRESS NOTE ADULT - SUBJECTIVE AND OBJECTIVE BOX
Patient is a 74y old  Male who presents with a chief complaint of Hypoxia   COVID-19 + (31 Mar 2020 09:59)        Over Night Events: Patient remains vented, sedated on propofol and versed, on levophed. Desaturated to SPO2 80's overnight. Worsening CXR this am, vent settings adjusted. Family, daughter, Dr. Donnelly (family physician) made aware of patient's current condition. Daughter agreed to talk to palliative team tomorrow.        ROS:  See HPI    PHYSICAL EXAM    ICU Vital Signs Last 24 Hrs  T(C): 36.8 (31 Mar 2020 09:06), Max: 37.1 (30 Mar 2020 20:20)  T(F): 98.3 (31 Mar 2020 09:06), Max: 98.8 (31 Mar 2020 03:01)  HR: 105 (31 Mar 2020 14:00) (96 - 127)  BP: 131/58 (31 Mar 2020 14:14) (86/50 - 147/66)  BP(mean): 80 (31 Mar 2020 09:06) (80 - 86)  ABP: --  ABP(mean): --  RR: 26 (31 Mar 2020 09:06) (26 - 33)  SpO2: 99% (31 Mar 2020 14:00) (80% - 99%)      03-30-20 @ 07:01  -  03-31-20 @ 07:00  --------------------------------------------------------  IN:    dextrose 5%: 1075 mL    insulin regular Infusion: 40 mL    midazolam Infusion: 107.8 mL    norepinephrine Infusion: 132.2 mL    propofol Infusion: 42.9 mL  Total IN: 1397.9 mL    OUT:    Indwelling Catheter - Urethral: 570 mL  Total OUT: 570 mL    Total NET: 827.9 mL          LABS:                            8.1    9.32  )-----------( 122      ( 31 Mar 2020 05:30 )             25.1                                               03-31    140  |  97<L>  |  82<HH>  ----------------------------<  172<H>  4.2   |  23  |  6.9<HH>    Ca    6.4<L>      31 Mar 2020 08:30  Phos  TNP     03-31  Mg     TNP     03-31    TPro  4.6<L>  /  Alb  2.6<L>  /  TBili  <0.2  /  DBili  x   /  AST  58<H>  /  ALT  25  /  AlkPhos  41  03-31      PT/INR - ( 31 Mar 2020 08:30 )   PT: 14.10 sec;   INR: 1.23 ratio         PTT - ( 31 Mar 2020 08:30 )  PTT:36.6 sec                                           CARDIAC MARKERS ( 31 Mar 2020 07:25 )  x     / x     / 665 U/L / x     / x                                                LIVER FUNCTIONS - ( 31 Mar 2020 08:30 )  Alb: 2.6 g/dL / Pro: 4.6 g/dL / ALK PHOS: 41 U/L / ALT: 25 U/L / AST: 58 U/L / GGT: x                                                  Culture - Blood (collected 29 Mar 2020 23:45)  Source: .Blood Blood-Peripheral  Preliminary Report (31 Mar 2020 06:02):    No growth to date.    Culture - Blood (collected 29 Mar 2020 23:45)  Source: .Blood Blood-Peripheral  Preliminary Report (31 Mar 2020 06:02):    No growth to date.                                                   Mode: AC/ CMV (Assist Control/ Continuous Mandatory Ventilation)  RR (machine): 24  TV (machine): 400  FiO2: 100  PEEP: 20  ITime: 1  MAP: 15  PIP: 26                                      ABG - ( 31 Mar 2020 14:15 )  pH, Arterial: 7.13  pH, Blood: x     /  pCO2: 76    /  pO2: 177   / HCO3: 25    / Base Excess: -5.3  /  SaO2: 98          MEDICATIONS  (STANDING):  atorvastatin 40 milliGRAM(s) Oral at bedtime  chlorhexidine 4% Liquid 1 Application(s) Topical <User Schedule>  etomidate Injectable 20 milliGRAM(s) IV Push once  fenofibrate Tablet 48 milliGRAM(s) Oral daily  folic acid 1 milliGRAM(s) Oral daily  heparin  Injectable 5000 Unit(s) SubCutaneous every 8 hours  hydroxychloroquine   Oral   hydroxychloroquine 200 milliGRAM(s) Oral every 12 hours  insulin regular Infusion 2 Unit(s)/Hr (2 mL/Hr) IV Continuous <Continuous>  levothyroxine 25 MICROGram(s) Oral daily  methylPREDNISolone sodium succinate Injectable 60 milliGRAM(s) IV Push every 12 hours  midazolam Infusion 0.15 mG/kG/Hr (10.2 mL/Hr) IV Continuous <Continuous>  mycophenolate mofetil 500 milliGRAM(s) Oral two times a day  norepinephrine Infusion 0.1 MICROgram(s)/kG/Min (6.09 mL/Hr) IV Continuous <Continuous>  pentoxifylline 400 milliGRAM(s) Oral at bedtime  pentoxifylline 400 milliGRAM(s) Oral two times a day  propofol Infusion 10 MICROgram(s)/kG/Min (3.9 mL/Hr) IV Continuous <Continuous>  rocuronium Injectable 100 milliGRAM(s) IV Push once  rocuronium Injectable 65 milliGRAM(s) IV Push once  senna 2 Tablet(s) Oral at bedtime  sodium chloride 0.45% 1000 milliLiter(s) (125 mL/Hr) IV Continuous <Continuous>    MEDICATIONS  (PRN):    GENERAL: sedated, intubated,   HEENT:  Atraumatic, Normocephalic  PULMONARY: bilateral rhonchi +, bibasilar crackles, labored breathing  CARDIOVASCULAR: Regular rate and rhythm;   GASTROINTESTINAL: Soft, Nontender, Nondistended; Bowel sounds present,  : Alonzo in place  MUSCULOSKELETAL:  2+ Peripheral Pulses, no edema

## 2020-04-01 LAB
ALBUMIN SERPL ELPH-MCNC: 2 G/DL — LOW (ref 3.5–5.2)
ALP SERPL-CCNC: 68 U/L — SIGNIFICANT CHANGE UP (ref 30–115)
ALT FLD-CCNC: 29 U/L — SIGNIFICANT CHANGE UP (ref 0–41)
ANION GAP SERPL CALC-SCNC: 23 MMOL/L — HIGH (ref 7–14)
AST SERPL-CCNC: 68 U/L — HIGH (ref 0–41)
BASOPHILS # BLD AUTO: 0.02 K/UL — SIGNIFICANT CHANGE UP (ref 0–0.2)
BASOPHILS NFR BLD AUTO: 0.1 % — SIGNIFICANT CHANGE UP (ref 0–1)
BILIRUB SERPL-MCNC: <0.2 MG/DL — SIGNIFICANT CHANGE UP (ref 0.2–1.2)
BUN SERPL-MCNC: 91 MG/DL — CRITICAL HIGH (ref 10–20)
CALCIUM SERPL-MCNC: 6.1 MG/DL — LOW (ref 8.5–10.1)
CHLORIDE SERPL-SCNC: 88 MMOL/L — LOW (ref 98–110)
CO2 SERPL-SCNC: 25 MMOL/L — SIGNIFICANT CHANGE UP (ref 17–32)
CREAT SERPL-MCNC: 7.4 MG/DL — CRITICAL HIGH (ref 0.7–1.5)
CRP SERPL-MCNC: 26.17 MG/DL — HIGH (ref 0–0.4)
CRP SERPL-MCNC: 28.31 MG/DL — HIGH (ref 0–0.4)
D DIMER BLD IA.RAPID-MCNC: 794 NG/ML DDU — HIGH (ref 0–230)
EOSINOPHIL # BLD AUTO: 0 K/UL — SIGNIFICANT CHANGE UP (ref 0–0.7)
EOSINOPHIL NFR BLD AUTO: 0 % — SIGNIFICANT CHANGE UP (ref 0–8)
ERYTHROCYTE [SEDIMENTATION RATE] IN BLOOD: 124 MM/HR — HIGH (ref 0–10)
FERRITIN SERPL-MCNC: 4870 NG/ML — HIGH (ref 30–400)
FERRITIN SERPL-MCNC: 4954 NG/ML — HIGH (ref 30–400)
GLUCOSE BLDC GLUCOMTR-MCNC: 312 MG/DL — HIGH (ref 70–99)
GLUCOSE BLDC GLUCOMTR-MCNC: 373 MG/DL — HIGH (ref 70–99)
GLUCOSE BLDC GLUCOMTR-MCNC: 379 MG/DL — HIGH (ref 70–99)
GLUCOSE BLDC GLUCOMTR-MCNC: 385 MG/DL — HIGH (ref 70–99)
GLUCOSE BLDC GLUCOMTR-MCNC: 386 MG/DL — HIGH (ref 70–99)
GLUCOSE BLDC GLUCOMTR-MCNC: 431 MG/DL — HIGH (ref 70–99)
GLUCOSE SERPL-MCNC: 418 MG/DL — HIGH (ref 70–99)
HCT VFR BLD CALC: 31.9 % — LOW (ref 42–52)
HGB BLD-MCNC: 9.9 G/DL — LOW (ref 14–18)
IL6 FLD-MCNC: 222.2 PG/ML — HIGH (ref 0–15.5)
IMM GRANULOCYTES NFR BLD AUTO: 0.8 % — HIGH (ref 0.1–0.3)
LDH SERPL L TO P-CCNC: 721 U/L — HIGH (ref 50–242)
LYMPHOCYTES # BLD AUTO: 0.2 K/UL — LOW (ref 1.2–3.4)
LYMPHOCYTES # BLD AUTO: 1.3 % — LOW (ref 20.5–51.1)
MAGNESIUM SERPL-MCNC: 2.7 MG/DL — HIGH (ref 1.8–2.4)
MCHC RBC-ENTMCNC: 28.9 PG — SIGNIFICANT CHANGE UP (ref 27–31)
MCHC RBC-ENTMCNC: 31 G/DL — LOW (ref 32–37)
MCV RBC AUTO: 93 FL — SIGNIFICANT CHANGE UP (ref 80–94)
MONOCYTES # BLD AUTO: 0.32 K/UL — SIGNIFICANT CHANGE UP (ref 0.1–0.6)
MONOCYTES NFR BLD AUTO: 2.1 % — SIGNIFICANT CHANGE UP (ref 1.7–9.3)
MRSA PCR RESULT.: NEGATIVE — SIGNIFICANT CHANGE UP
NEUTROPHILS # BLD AUTO: 14.51 K/UL — HIGH (ref 1.4–6.5)
NEUTROPHILS NFR BLD AUTO: 95.7 % — HIGH (ref 42.2–75.2)
NRBC # BLD: 0 /100 WBCS — SIGNIFICANT CHANGE UP (ref 0–0)
PHOSPHATE SERPL-MCNC: 13.1 MG/DL — HIGH (ref 2.1–4.9)
PLATELET # BLD AUTO: 177 K/UL — SIGNIFICANT CHANGE UP (ref 130–400)
POTASSIUM SERPL-MCNC: 4.5 MMOL/L — SIGNIFICANT CHANGE UP (ref 3.5–5)
POTASSIUM SERPL-SCNC: 4.5 MMOL/L — SIGNIFICANT CHANGE UP (ref 3.5–5)
PROCALCITONIN SERPL-MCNC: 11.4 NG/ML — HIGH (ref 0.02–0.1)
PROT SERPL-MCNC: 4.5 G/DL — LOW (ref 6–8)
RBC # BLD: 3.43 M/UL — LOW (ref 4.7–6.1)
RBC # FLD: 14.5 % — SIGNIFICANT CHANGE UP (ref 11.5–14.5)
SODIUM SERPL-SCNC: 136 MMOL/L — SIGNIFICANT CHANGE UP (ref 135–146)
WBC # BLD: 15.17 K/UL — HIGH (ref 4.8–10.8)
WBC # FLD AUTO: 15.17 K/UL — HIGH (ref 4.8–10.8)

## 2020-04-01 PROCEDURE — 71045 X-RAY EXAM CHEST 1 VIEW: CPT | Mod: 26

## 2020-04-01 PROCEDURE — 99232 SBSQ HOSP IP/OBS MODERATE 35: CPT

## 2020-04-01 PROCEDURE — 93010 ELECTROCARDIOGRAM REPORT: CPT

## 2020-04-01 RX ORDER — SODIUM BICARBONATE 1 MEQ/ML
50 SYRINGE (ML) INTRAVENOUS ONCE
Refills: 0 | Status: COMPLETED | OUTPATIENT
Start: 2020-04-01 | End: 2020-04-01

## 2020-04-01 RX ORDER — SODIUM BICARBONATE 1 MEQ/ML
650 SYRINGE (ML) INTRAVENOUS
Refills: 0 | Status: DISCONTINUED | OUTPATIENT
Start: 2020-04-01 | End: 2020-04-02

## 2020-04-01 RX ORDER — CISATRACURIUM BESYLATE 2 MG/ML
3 INJECTION INTRAVENOUS
Qty: 200 | Refills: 0 | Status: DISCONTINUED | OUTPATIENT
Start: 2020-04-01 | End: 2020-04-02

## 2020-04-01 RX ORDER — MEROPENEM 1 G/30ML
500 INJECTION INTRAVENOUS ONCE
Refills: 0 | Status: COMPLETED | OUTPATIENT
Start: 2020-04-01 | End: 2020-04-01

## 2020-04-01 RX ORDER — FUROSEMIDE 40 MG
80 TABLET ORAL ONCE
Refills: 0 | Status: COMPLETED | OUTPATIENT
Start: 2020-04-01 | End: 2020-04-01

## 2020-04-01 RX ORDER — INSULIN HUMAN 100 [IU]/ML
10 INJECTION, SOLUTION SUBCUTANEOUS ONCE
Refills: 0 | Status: COMPLETED | OUTPATIENT
Start: 2020-04-01 | End: 2020-04-01

## 2020-04-01 RX ORDER — MEROPENEM 1 G/30ML
1000 INJECTION INTRAVENOUS EVERY 24 HOURS
Refills: 0 | Status: DISCONTINUED | OUTPATIENT
Start: 2020-04-01 | End: 2020-04-01

## 2020-04-01 RX ORDER — VASOPRESSIN 20 [USP'U]/ML
0.02 INJECTION INTRAVENOUS
Qty: 50 | Refills: 0 | Status: DISCONTINUED | OUTPATIENT
Start: 2020-04-01 | End: 2020-04-02

## 2020-04-01 RX ORDER — ROCURONIUM BROMIDE 10 MG/ML
100 VIAL (ML) INTRAVENOUS ONCE
Refills: 0 | Status: DISCONTINUED | OUTPATIENT
Start: 2020-04-01 | End: 2020-04-01

## 2020-04-01 RX ORDER — VANCOMYCIN HCL 1 G
1000 VIAL (EA) INTRAVENOUS ONCE
Refills: 0 | Status: COMPLETED | OUTPATIENT
Start: 2020-04-01 | End: 2020-04-01

## 2020-04-01 RX ORDER — FUROSEMIDE 40 MG
160 TABLET ORAL ONCE
Refills: 0 | Status: DISCONTINUED | OUTPATIENT
Start: 2020-04-01 | End: 2020-04-01

## 2020-04-01 RX ORDER — INSULIN GLARGINE 100 [IU]/ML
10 INJECTION, SOLUTION SUBCUTANEOUS AT BEDTIME
Refills: 0 | Status: DISCONTINUED | OUTPATIENT
Start: 2020-04-01 | End: 2020-04-02

## 2020-04-01 RX ORDER — INSULIN LISPRO 100/ML
VIAL (ML) SUBCUTANEOUS EVERY 6 HOURS
Refills: 0 | Status: DISCONTINUED | OUTPATIENT
Start: 2020-04-01 | End: 2020-04-02

## 2020-04-01 RX ORDER — SODIUM CHLORIDE 9 MG/ML
1000 INJECTION, SOLUTION INTRAVENOUS
Refills: 0 | Status: DISCONTINUED | OUTPATIENT
Start: 2020-04-01 | End: 2020-04-01

## 2020-04-01 RX ORDER — CISATRACURIUM BESYLATE 2 MG/ML
10 INJECTION INTRAVENOUS ONCE
Refills: 0 | Status: DISCONTINUED | OUTPATIENT
Start: 2020-04-01 | End: 2020-04-01

## 2020-04-01 RX ADMIN — Medication 50 MILLIEQUIVALENT(S): at 08:09

## 2020-04-01 RX ADMIN — Medication 200 MILLIGRAM(S): at 17:55

## 2020-04-01 RX ADMIN — Medication 48 MILLIGRAM(S): at 11:30

## 2020-04-01 RX ADMIN — SODIUM CHLORIDE 125 MILLILITER(S): 9 INJECTION, SOLUTION INTRAVENOUS at 06:05

## 2020-04-01 RX ADMIN — Medication 1 MILLIGRAM(S): at 11:30

## 2020-04-01 RX ADMIN — Medication 8: at 23:53

## 2020-04-01 RX ADMIN — Medication 80 MILLIGRAM(S): at 11:17

## 2020-04-01 RX ADMIN — Medication 80 MILLIGRAM(S): at 15:38

## 2020-04-01 RX ADMIN — MEROPENEM 100 MILLIGRAM(S): 1 INJECTION INTRAVENOUS at 11:32

## 2020-04-01 RX ADMIN — Medication 400 MILLIGRAM(S): at 04:40

## 2020-04-01 RX ADMIN — HEPARIN SODIUM 5000 UNIT(S): 5000 INJECTION INTRAVENOUS; SUBCUTANEOUS at 22:42

## 2020-04-01 RX ADMIN — Medication 650 MILLIGRAM(S): at 17:55

## 2020-04-01 RX ADMIN — Medication 400 MILLIGRAM(S): at 22:42

## 2020-04-01 RX ADMIN — INSULIN HUMAN 10 UNIT(S): 100 INJECTION, SOLUTION SUBCUTANEOUS at 05:16

## 2020-04-01 RX ADMIN — Medication 6: at 05:16

## 2020-04-01 RX ADMIN — Medication 80 MILLIGRAM(S): at 15:37

## 2020-04-01 RX ADMIN — INSULIN GLARGINE 10 UNIT(S): 100 INJECTION, SOLUTION SUBCUTANEOUS at 22:42

## 2020-04-01 RX ADMIN — Medication 10: at 17:57

## 2020-04-01 RX ADMIN — Medication 25 MICROGRAM(S): at 04:39

## 2020-04-01 RX ADMIN — Medication 50 MILLIEQUIVALENT(S): at 08:08

## 2020-04-01 RX ADMIN — Medication 200 MILLIGRAM(S): at 04:39

## 2020-04-01 RX ADMIN — Medication 250 MILLIGRAM(S): at 11:30

## 2020-04-01 RX ADMIN — Medication 400 MILLIGRAM(S): at 17:55

## 2020-04-01 RX ADMIN — HEPARIN SODIUM 5000 UNIT(S): 5000 INJECTION INTRAVENOUS; SUBCUTANEOUS at 04:39

## 2020-04-01 RX ADMIN — CISATRACURIUM BESYLATE 11.7 MICROGRAM(S)/KG/MIN: 2 INJECTION INTRAVENOUS at 19:19

## 2020-04-01 RX ADMIN — Medication 60 MILLIGRAM(S): at 17:55

## 2020-04-01 RX ADMIN — HEPARIN SODIUM 5000 UNIT(S): 5000 INJECTION INTRAVENOUS; SUBCUTANEOUS at 13:40

## 2020-04-01 RX ADMIN — CHLORHEXIDINE GLUCONATE 1 APPLICATION(S): 213 SOLUTION TOPICAL at 04:40

## 2020-04-01 RX ADMIN — MYCOPHENOLATE MOFETIL 500 MILLIGRAM(S): 250 CAPSULE ORAL at 17:55

## 2020-04-01 RX ADMIN — Medication 60 MILLIGRAM(S): at 04:40

## 2020-04-01 RX ADMIN — SENNA PLUS 2 TABLET(S): 8.6 TABLET ORAL at 22:41

## 2020-04-01 RX ADMIN — ATORVASTATIN CALCIUM 40 MILLIGRAM(S): 80 TABLET, FILM COATED ORAL at 22:41

## 2020-04-01 RX ADMIN — Medication 5: at 11:30

## 2020-04-01 RX ADMIN — MYCOPHENOLATE MOFETIL 500 MILLIGRAM(S): 250 CAPSULE ORAL at 04:40

## 2020-04-01 NOTE — PROGRESS NOTE ADULT - ASSESSMENT
74M PMHx heart transplant (13 years ago) , HTN, DM2, CKD, HLD, COPD, hypothyroidism recently admitted for cough and SOB diagnosed with COVID-19 discharged in stable condition presents ER for evaluation of worsening SOB found to be in hypoxic respiratory failure s/p intubation.     IMPRESSION:  Acute Hypoxic Respiratory Failure/ Severe ARDS  COVID-19 PNA  Acute on Chronic Kidney Failure (CKD V)  Septic Shock      PLAN:    CNS: Sedated on Propofol and Nimbex    HEENT: Oral care    PULMONARY:  HOB @ 45 degrees.  Vent changes to be adjusted post repeat ABG this afternoon    CARDIOVASCULAR: Bed side Echo, patient volume overloaded, IVF d/c, s/p IV lasix    GI: GI prophylaxis. c/w  tube feeding Feeding.  Bowel regimen (lactulose and senna)    RENAL: Worsening uremia, High Anion Gap metabolic acidosis, Renal following: RRT scheduled tomorrow, possible udall keke if condition improves    INFECTIOUS DISEASE: BCX negative, c/w HCQ, started on Meropenem and vancomycin    HEMATOLOGICAL:  DVT prophylaxis.    ENDOCRINE: c/w solumedrol 60 mg q12 day #    CODE: full   Grave prognosis, remains full code

## 2020-04-01 NOTE — DIETITIAN INITIAL EVALUATION ADULT. - NAME AND PHONE
RD to monitor diet order, energy intake, renal/glucose profiles, nutrition focused physical findings

## 2020-04-01 NOTE — DIETITIAN INITIAL EVALUATION ADULT. - ENERGY NEEDS
Calories: 1570 kcal/day ([id number])  Protein: 78-98 gm/day (1.2-1.5 gm/kg CBW)   Fluid: per VENT team

## 2020-04-01 NOTE — PROGRESS NOTE ADULT - SUBJECTIVE AND OBJECTIVE BOX
Patient is a 74y old  Male who presents with a chief complaint of Hypoxia   COVID-19 + (01 Apr 2020 10:44)        Over Night Events: Patient desaturated to 80's overnight, started on nimbex this am, on 100% FiO2, without much improvement. Nephro recommended [place] placement for HD tomorrow, however, patient's condition is currently deteriorating daughter contacted and explained grave prognosis, and wants the patient to remain full code.         ROS:  See HPI    PHYSICAL EXAM    ICU Vital Signs Last 24 Hrs  T(C): 37.2 (01 Apr 2020 16:25), Max: 37.3 (01 Apr 2020 10:55)  T(F): 99 (01 Apr 2020 16:25), Max: 99.1 (01 Apr 2020 10:55)  HR: 124 (01 Apr 2020 19:13) (92 - 126)  BP: 112/55 (01 Apr 2020 16:25) (90/51 - 150/65)  BP(mean): 74 (01 Apr 2020 16:25) (68 - 94)  ABP: --  ABP(mean): --  RR: 20 (01 Apr 2020 16:25) (20 - 20)  SpO2: 75% (01 Apr 2020 19:13) (75% - 99%)      03-31-20 @ 07:01  -  04-01-20 @ 07:00  --------------------------------------------------------  IN:    Enteral Tube Flush: 400 mL    Glucerna: 1320 mL    Oral Fluid: 1500 mL  Total IN: 3220 mL    OUT:    Indwelling Catheter - Urethral: 600 mL  Total OUT: 600 mL    Total NET: 2620 mL      04-01-20 @ 07:01  -  04-01-20 @ 19:21  --------------------------------------------------------  IN:    Glucerna: 480 mL    norepinephrine Infusion: 504 mL    propofol Infusion: 117 mL    vasopressin Infusion: 14 mL  Total IN: 1115 mL    OUT:    Indwelling Catheter - Urethral: 600 mL  Total OUT: 600 mL    Total NET: 515 mL          LABS:                            9.9    15.17 )-----------( 177      ( 01 Apr 2020 08:10 )             31.9                                               04-01    136  |  88<L>  |  91<HH>  ----------------------------<  418<H>  4.5   |  25  |  7.4<HH>    Ca    6.1<L>      01 Apr 2020 08:10  Phos  13.1     04-01  Mg     2.7     04-01    TPro  4.5<L>  /  Alb  2.0<L>  /  TBili  <0.2  /  DBili  x   /  AST  68<H>  /  ALT  29  /  AlkPhos  68  04-01      PT/INR - ( 31 Mar 2020 08:30 )   PT: 14.10 sec;   INR: 1.23 ratio         PTT - ( 31 Mar 2020 08:30 )  PTT:36.6 sec                                           CARDIAC MARKERS ( 31 Mar 2020 07:25 )  x     / x     / 665 U/L / x     / x                                                LIVER FUNCTIONS - ( 01 Apr 2020 08:10 )  Alb: 2.0 g/dL / Pro: 4.5 g/dL / ALK PHOS: 68 U/L / ALT: 29 U/L / AST: 68 U/L / GGT: x                                                  Culture - Blood (collected 29 Mar 2020 23:45)  Source: .Blood Blood-Peripheral  Preliminary Report (31 Mar 2020 06:02):    No growth to date.    Culture - Blood (collected 29 Mar 2020 23:45)  Source: .Blood Blood-Peripheral  Preliminary Report (31 Mar 2020 06:02):    No growth to date.                                                   Mode: AC/ CMV (Assist Control/ Continuous Mandatory Ventilation)  RR (machine): 26  TV (machine): 350  FiO2: 100  PEEP: 15  MAP: 24  PIP: 34                                      ABG - ( 01 Apr 2020 12:58 )  pH, Arterial: 7.24  pH, Blood: x     /  pCO2: 71    /  pO2: 70    / HCO3: 30    / Base Excess: 1.9   /  SaO2: 90                  MEDICATIONS  (STANDING):  atorvastatin 40 milliGRAM(s) Oral at bedtime  chlorhexidine 4% Liquid 1 Application(s) Topical <User Schedule>  cisatracurium Infusion 3 MICROgram(s)/kG/Min (11.7 mL/Hr) IV Continuous <Continuous>  dextrose 5%. 1000 milliLiter(s) (50 mL/Hr) IV Continuous <Continuous>  dextrose 50% Injectable 12.5 Gram(s) IV Push once  dextrose 50% Injectable 25 Gram(s) IV Push once  dextrose 50% Injectable 25 Gram(s) IV Push once  etomidate Injectable 20 milliGRAM(s) IV Push once  fenofibrate Tablet 48 milliGRAM(s) Oral daily  folic acid 1 milliGRAM(s) Oral daily  heparin  Injectable 5000 Unit(s) SubCutaneous every 8 hours  hydroxychloroquine   Oral   hydroxychloroquine 200 milliGRAM(s) Oral every 12 hours  insulin glargine Injectable (LANTUS) 10 Unit(s) SubCutaneous at bedtime  insulin lispro (HumaLOG) corrective regimen sliding scale   SubCutaneous every 6 hours  levothyroxine 25 MICROGram(s) Oral daily  methylPREDNISolone sodium succinate Injectable 60 milliGRAM(s) IV Push every 12 hours  mycophenolate mofetil 500 milliGRAM(s) Oral two times a day  norepinephrine Infusion 0.1 MICROgram(s)/kG/Min (6.09 mL/Hr) IV Continuous <Continuous>  pentoxifylline 400 milliGRAM(s) Oral at bedtime  pentoxifylline 400 milliGRAM(s) Oral two times a day  propofol Infusion 10 MICROgram(s)/kG/Min (3.9 mL/Hr) IV Continuous <Continuous>  senna 2 Tablet(s) Oral at bedtime  sodium bicarbonate 650 milliGRAM(s) Oral two times a day  vasopressin Infusion 0.02 Unit(s)/Min (1.2 mL/Hr) IV Continuous <Continuous>    MEDICATIONS  (PRN):  dextrose 40% Gel 15 Gram(s) Oral once PRN Blood Glucose LESS THAN 70 milliGRAM(s)/deciliter  glucagon  Injectable 1 milliGRAM(s) IntraMuscular once PRN Glucose LESS THAN 70 milligrams/deciliter      Xrays:                                                                                     ECHO

## 2020-04-01 NOTE — PROGRESS NOTE ADULT - SUBJECTIVE AND OBJECTIVE BOX
Patient is a 74y old  Male who presents with a chief complaint of Hypoxia   COVID-19 + (01 Apr 2020 09:06)        Over Night Events:  Remains intubated on MV.  On Levophed and Vaso.  Sedated and paralyzed.          ROS:     All ROS are negative except HPI         PHYSICAL EXAM    ICU Vital Signs Last 24 Hrs  T(C): 35.6 (01 Apr 2020 08:35), Max: 35.6 (01 Apr 2020 08:35)  T(F): 96.1 (01 Apr 2020 08:35), Max: 96.1 (01 Apr 2020 08:35)  HR: 114 (01 Apr 2020 08:35) (92 - 114)  BP: 150/65 (01 Apr 2020 08:35) (86/50 - 150/65)  BP(mean): 94 (01 Apr 2020 08:35) (68 - 94)  ABP: --  ABP(mean): --  RR: 20 (01 Apr 2020 08:35) (20 - 20)  SpO2: 99% (01 Apr 2020 02:26) (88% - 100%)      CONSTITUTIONAL:   Ill appearing.  Well nourished.  NAD    ENT:   Airway patent,   Mouth with normal mucosa.   No thrush    EYES:   Pupils equal,   Round and reactive to light.    CARDIAC:   Tachycardic   Regular rhythm.    No edema      Vascular:  Normal systolic impulse  No Carotid bruits    RESPIRATORY:   No wheezing  Bilateral BS  Normal chest expansion  Not tachypneic,  No use of accessory muscles    GASTROINTESTINAL:  Abdomen soft,   Non-tender,   No guarding,   + BS    GENITOURINARY  normal genitalia for sex   edema    MUSCULOSKELETAL:   Range of motion is not limited,  No clubbing, cyanosis    NEUROLOGICAL:   Alert and oriented   No motor  deficits.  pertinent DTRs normal    SKIN:   Skin normal color for race,   Warm and dry and intact.   No evidence of rash.    PSYCHIATRIC:   sEDATED   No apparent risk to self or others.    HEMATOLOGICAL:  No cervical  lymphadenopathy.  no inguinal lymphadenopathy      03-31-20 @ 07:01  -  04-01-20 @ 07:00  --------------------------------------------------------  IN:    Enteral Tube Flush: 400 mL    Glucerna: 1320 mL    Oral Fluid: 1500 mL  Total IN: 3220 mL    OUT:    Indwelling Catheter - Urethral: 600 mL  Total OUT: 600 mL    Total NET: 2620 mL          LABS:                            9.9    15.17 )-----------( 177      ( 01 Apr 2020 08:10 )             31.9                                               04-01    136  |  88<L>  |  91<HH>  ----------------------------<  418<H>  4.5   |  25  |  7.4<HH>    Ca    6.1<L>      01 Apr 2020 08:10  Phos  13.1     04-01  Mg     2.7     04-01    TPro  4.5<L>  /  Alb  2.0<L>  /  TBili  <0.2  /  DBili  x   /  AST  68<H>  /  ALT  29  /  AlkPhos  68  04-01      PT/INR - ( 31 Mar 2020 08:30 )   PT: 14.10 sec;   INR: 1.23 ratio         PTT - ( 31 Mar 2020 08:30 )  PTT:36.6 sec                                           CARDIAC MARKERS ( 31 Mar 2020 07:25 )  x     / x     / 665 U/L / x     / x                                                LIVER FUNCTIONS - ( 01 Apr 2020 08:10 )  Alb: 2.0 g/dL / Pro: 4.5 g/dL / ALK PHOS: 68 U/L / ALT: 29 U/L / AST: 68 U/L / GGT: x                                                  Culture - Blood (collected 29 Mar 2020 23:45)  Source: .Blood Blood-Peripheral  Preliminary Report (31 Mar 2020 06:02):    No growth to date.    Culture - Blood (collected 29 Mar 2020 23:45)  Source: .Blood Blood-Peripheral  Preliminary Report (31 Mar 2020 06:02):    No growth to date.                                                   Mode: AC/ CMV (Assist Control/ Continuous Mandatory Ventilation)  RR (machine): 32  TV (machine): 300  FiO2: 100  PEEP: 15  ITime: 1  MAP: 22  PIP: 29                                      ABG - ( 01 Apr 2020 05:17 )  pH, Arterial: 7.09  pH, Blood: x     /  pCO2: 86    /  pO2: 54    / HCO3: 26    / Base Excess: -5.8  /  SaO2: 77                  MEDICATIONS  (STANDING):  atorvastatin 40 milliGRAM(s) Oral at bedtime  chlorhexidine 4% Liquid 1 Application(s) Topical <User Schedule>  cisatracurium Infusion 3 MICROgram(s)/kG/Min (11.7 mL/Hr) IV Continuous <Continuous>  dextrose 5%. 1000 milliLiter(s) (50 mL/Hr) IV Continuous <Continuous>  dextrose 50% Injectable 12.5 Gram(s) IV Push once  dextrose 50% Injectable 25 Gram(s) IV Push once  dextrose 50% Injectable 25 Gram(s) IV Push once  etomidate Injectable 20 milliGRAM(s) IV Push once  fenofibrate Tablet 48 milliGRAM(s) Oral daily  folic acid 1 milliGRAM(s) Oral daily  heparin  Injectable 5000 Unit(s) SubCutaneous every 8 hours  hydroxychloroquine   Oral   hydroxychloroquine 200 milliGRAM(s) Oral every 12 hours  insulin lispro (HumaLOG) corrective regimen sliding scale   SubCutaneous every 6 hours  levothyroxine 25 MICROGram(s) Oral daily  meropenem  IVPB 500 milliGRAM(s) IV Intermittent once  methylPREDNISolone sodium succinate Injectable 60 milliGRAM(s) IV Push every 12 hours  mycophenolate mofetil 500 milliGRAM(s) Oral two times a day  norepinephrine Infusion 0.1 MICROgram(s)/kG/Min (6.09 mL/Hr) IV Continuous <Continuous>  pentoxifylline 400 milliGRAM(s) Oral at bedtime  pentoxifylline 400 milliGRAM(s) Oral two times a day  propofol Infusion 10 MICROgram(s)/kG/Min (3.9 mL/Hr) IV Continuous <Continuous>  rocuronium Injectable 100 milliGRAM(s) IV Push once  senna 2 Tablet(s) Oral at bedtime  sodium chloride 0.45% 1000 milliLiter(s) (75 mL/Hr) IV Continuous <Continuous>  vancomycin  IVPB 1000 milliGRAM(s) IV Intermittent once  vasopressin Infusion 0.02 Unit(s)/Min (1.2 mL/Hr) IV Continuous <Continuous>    MEDICATIONS  (PRN):  dextrose 40% Gel 15 Gram(s) Oral once PRN Blood Glucose LESS THAN 70 milliGRAM(s)/deciliter  glucagon  Injectable 1 milliGRAM(s) IntraMuscular once PRN Glucose LESS THAN 70 milligrams/deciliter      New X-rays reviewed:                                                                                  ECHO    CXR interpreted by me:  et ok>  bILATERAL INFILTRATES

## 2020-04-01 NOTE — PROGRESS NOTE ADULT - ASSESSMENT
IMPRESSION:    Acute hypoxic resp failure/ COVID +/ ARDS  ho Heart transplant  Acute on chronic renal  failure   Sepsis   Septic shock     PLAN:    CNS: Assure adequate sedation.  Paralysis D#1     HEENT:  Oral care    PULMONARY:  HOB @ 45 degrees, vent changes .  RR 30.  PPL 30 .    CARDIOVASCULAR: Avoid volume overload Trial Lasix.  Monitor QTC     GI: GI prophylaxis                        tube feeds     RENAL:  F/u  lytes.  Correct as needed. accurate I/OM renal f/u, repeat CMP.  Needs HD.  DW renal     INFECTIOUS DISEASE: PLAQUENIL, Audra and VAnc     HEMATOLOGICAL:  DVT prophylaxis.    ENDOCRINE:  Follow up FS.  Insulin protocol if needed.  Solumedrol  60 bid D#2    CODE STATUS: FULL CODE    DISPOSITION: Pt requires continued monitoring in the MICU  palliative care  poor prognosis

## 2020-04-01 NOTE — GOALS OF CARE CONVERSATION - ADVANCED CARE PLANNING - CONVERSATION DETAILS
Palliative call to patient's daughter, she reports she discussed code status with her family.  At this time family wants patient to remain FULL CODE for now.  She wants to see how patient does over the weekend and then revisit Goals of Care.  April, who is an RN at the hospital has full understanding of patient's prognosis and poor overall condition.  Palliative team will remain available for support.  [phone]

## 2020-04-01 NOTE — DIETITIAN INITIAL EVALUATION ADULT. - PERTINENT MEDS FT
heparin, 1/2NS, insulin, nimbex (active in EMR, however doesn't seem to be running), vasopressin, methylprednisone, lipitor, fenofibrate, folic acid, synthroid, levophed, propofol (active in EMR, however not running at this time), senna

## 2020-04-01 NOTE — DIETITIAN INITIAL EVALUATION ADULT. - PHYSICAL APPEARANCE
well nourished/intubated/ventilated. BMI: 24.6, IBW: 130#, no edema noted, skin intact. Unable to conduct NFPE due to limtied contact restrictions r/t medical condition and isolation precautions.

## 2020-04-01 NOTE — DIETITIAN INITIAL EVALUATION ADULT. - OTHER INFO
Nutrition Hx PTA: Unable to obtain at this time as pt is intubated.     Pt recently admitted for cough and SOB diagnosed with COVID-19 discharged in stable condition presents ER for evaluation of worsening SOB found to be in hypoxic respiratory failure s/p intubation. Remains intubated on MV.  On Levophed and Vaso.  Sedated and paralyzed. Pt remains full code as of now; will revisit [facility] of care after the weekend per Palliative team.

## 2020-04-01 NOTE — GOALS OF CARE CONVERSATION - ADVANCED CARE PLANNING - TREATMENT GUIDELINE COMMENT
FULL CODE - continue with ongoing aggressive medical management for now.  Daughter, April 89 , works on 4D [phone].  Plan is to readdress Goals of Care ongoing if patient continues to decline.  x [phone]

## 2020-04-01 NOTE — DIETITIAN INITIAL EVALUATION ADULT. - ENTERAL
If pt to remain on paralytic, recommend trickle feeds of Peptamen AF @ 25ml/hr as tolerated. If pt off of paralytic, recommend advancing Peptamen AF to goal rate of 50ml/hr as tolerated, which will provide a total of 1440kcal, 91gm pro, 979ml free water. Additional flushes per LIP. If pt to remain on paralytic, recommend trickle feeds of Peptamen AF @ 25ml/hr as tolerated (720kcal, 46gm pro, 487ml free water). If pt off of paralytic, recommend advancing Peptamen AF to goal rate of 50ml/hr as tolerated, which will provide a total of 1440kcal, 91gm pro, 979ml free water. Additional flushes per LIP.

## 2020-04-01 NOTE — PROGRESS NOTE ADULT - ASSESSMENT
74M PMHx heart transplant (13 years ago) , HTN, DM2, CKD, HLD, COPD, hypothyroidism recently admitted for cough and SOB diagnosed with COVID-19 discharged in stable condition presents ER for evaluation of worsening SOB found to be in hypoxic respiratory failure s/p intubation.    # JUAN JOSE / stage 4 ckd , baseline creatinine in the 3 range  # ?ATN in nature   #   cc overnight  #change iv fluids to NS at 75 cc per hour / DC bicarb   # i spoke with Dr pérez [phone] , off everolimus, on  cellcept 500 q 12, on stress dose steroids   # covid positive, intubated on HCQ  # check repeat labs, corrected calcium 8.4  # will insert U dall for HD in AM / spoke with wife and daughter who is a nurse at the hospital and they are agreeable on the plan to start RRT

## 2020-04-01 NOTE — PROGRESS NOTE ADULT - SUBJECTIVE AND OBJECTIVE BOX
Nephrology progress note    THIS IS AN INCOMPLETE NOTE . FULL NOTE TO FOLLOW SHORTLY    Patient is seen and examined, events over the last 24 h noted .    Allergies:  No Known Allergies    Hospital Medications:   MEDICATIONS  (STANDING):  atorvastatin 40 milliGRAM(s) Oral at bedtime  chlorhexidine 4% Liquid 1 Application(s) Topical <User Schedule>  cisatracurium Infusion 3 MICROgram(s)/kG/Min (11.7 mL/Hr) IV Continuous <Continuous>  dextrose 5%. 1000 milliLiter(s) (50 mL/Hr) IV Continuous <Continuous>  dextrose 50% Injectable 12.5 Gram(s) IV Push once  dextrose 50% Injectable 25 Gram(s) IV Push once  dextrose 50% Injectable 25 Gram(s) IV Push once  etomidate Injectable 20 milliGRAM(s) IV Push once  fenofibrate Tablet 48 milliGRAM(s) Oral daily  folic acid 1 milliGRAM(s) Oral daily  heparin  Injectable 5000 Unit(s) SubCutaneous every 8 hours  hydroxychloroquine   Oral   hydroxychloroquine 200 milliGRAM(s) Oral every 12 hours  insulin lispro (HumaLOG) corrective regimen sliding scale   SubCutaneous every 6 hours  levothyroxine 25 MICROGram(s) Oral daily  meropenem  IVPB 1000 milliGRAM(s) IV Intermittent every 24 hours  methylPREDNISolone sodium succinate Injectable 60 milliGRAM(s) IV Push every 12 hours  midazolam Infusion 0.15 mG/kG/Hr (10.2 mL/Hr) IV Continuous <Continuous>  mycophenolate mofetil 500 milliGRAM(s) Oral two times a day  norepinephrine Infusion 0.1 MICROgram(s)/kG/Min (6.09 mL/Hr) IV Continuous <Continuous>  pentoxifylline 400 milliGRAM(s) Oral at bedtime  pentoxifylline 400 milliGRAM(s) Oral two times a day  propofol Infusion 10 MICROgram(s)/kG/Min (3.9 mL/Hr) IV Continuous <Continuous>  rocuronium Injectable 100 milliGRAM(s) IV Push once  senna 2 Tablet(s) Oral at bedtime  sodium chloride 0.45% 1000 milliLiter(s) (75 mL/Hr) IV Continuous <Continuous>  vancomycin  IVPB 1000 milliGRAM(s) IV Intermittent once        VITALS:  T(F): 96.1 (04-01-20 @ 08:35), Max: 96.1 (04-01-20 @ 08:35)  HR: 114 (04-01-20 @ 08:35)  BP: 150/65 (04-01-20 @ 08:35)  RR: 20 (04-01-20 @ 08:35)  SpO2: 99% (04-01-20 @ 02:26)  Wt(kg): --    03-30 @ 07:01  -  03-31 @ 07:00  --------------------------------------------------------  IN: 1397.9 mL / OUT: 570 mL / NET: 827.9 mL    03-31 @ 07:01  -  04-01 @ 07:00  --------------------------------------------------------  IN: 3220 mL / OUT: 600 mL / NET: 2620 mL          PHYSICAL EXAM:  Constitutional: NAD  HEENT: anicteric sclera, oropharynx clear, MMM  Neck: No JVD  Respiratory: CTAB, no wheezes, rales or rhonchi  Cardiovascular: S1, S2, RRR  Gastrointestinal: BS+, soft, NT/ND  Extremities: No cyanosis or clubbing. No peripheral edema  :  No tipton.   Skin: No rashes    LABS:  03-31    140  |  97<L>  |  82<HH>  ----------------------------<  172<H>  4.2   |  23  |  6.9<HH>    Ca    6.4<L>      31 Mar 2020 08:30  Phos  TNP     03-31  Mg     TNP     03-31    TPro  4.6<L>  /  Alb  2.6<L>  /  TBili  <0.2  /  DBili      /  AST  58<H>  /  ALT  25  /  AlkPhos  41  03-31                          9.9    15.17 )-----------( 177      ( 01 Apr 2020 08:10 )             31.9       Urine Studies:      RADIOLOGY & ADDITIONAL STUDIES: Nephrology progress note  Patient is seen and examined, events over the last 24 h noted .  Still intubated on MV     Allergies:  No Known Allergies    Hospital Medications:   MEDICATIONS  (STANDING):  atorvastatin 40 milliGRAM(s) Oral at bedtime  cisatracurium Infusion 3 MICROgram(s)/kG/Min (11.7 mL/Hr) IV Continuous <Continuous>  dextrose 5%. 1000 milliLiter(s) (50 mL/Hr) IV Continuous <Continuous>  etomidate Injectable 20 milliGRAM(s) IV Push once  fenofibrate Tablet 48 milliGRAM(s) Oral daily  folic acid 1 milliGRAM(s) Oral daily  heparin  Injectable 5000 Unit(s) SubCutaneous every 8 hours  hydroxychloroquine 200 milliGRAM(s) Oral every 12 hours  insulin lispro (HumaLOG) corrective regimen sliding scale   SubCutaneous every 6 hours  levothyroxine 25 MICROGram(s) Oral daily  meropenem  IVPB 1000 milliGRAM(s) IV Intermittent every 24 hours  methylPREDNISolone sodium succinate Injectable 60 milliGRAM(s) IV Push every 12 hours  midazolam Infusion 0.15 mG/kG/Hr (10.2 mL/Hr) IV Continuous <Continuous>  mycophenolate mofetil 500 milliGRAM(s) Oral two times a day  norepinephrine Infusion 0.1 MICROgram(s)/kG/Min (6.09 mL/Hr) IV Continuous <Continuous>  pentoxifylline 400 milliGRAM(s) Oral at bedtime  pentoxifylline 400 milliGRAM(s) Oral two times a day  propofol Infusion 10 MICROgram(s)/kG/Min (3.9 mL/Hr) IV Continuous <Continuous>  rocuronium Injectable 100 milliGRAM(s) IV Push once  senna 2 Tablet(s) Oral at bedtime  sodium chloride 0.45% 1000 milliLiter(s) (75 mL/Hr) IV Continuous <Continuous>  vancomycin  IVPB 1000 milliGRAM(s) IV Intermittent once        VITALS:  T(F): 96.1 (04-01-20 @ 08:35), Max: 96.1 (04-01-20 @ 08:35)  HR: 114 (04-01-20 @ 08:35)  BP: 150/65 (04-01-20 @ 08:35)  RR: 20 (04-01-20 @ 08:35)  SpO2: 99% (04-01-20 @ 02:26)      03-30 @ 07:01  -  03-31 @ 07:00  --------------------------------------------------------  IN: 1397.9 mL / OUT: 570 mL / NET: 827.9 mL    03-31 @ 07:01  -  04-01 @ 07:00  --------------------------------------------------------  IN: 3220 mL / OUT: 600 mL / NET: 2620 mL          PHYSICAL EXAM:  Constitutional: intubated on MV   HEENT: anicteric sclera, oropharynx clear, MMM  Neck: No JVD  Respiratory: CTAB, no wheezes, rales or rhonchi  Cardiovascular: S1, S2, RRR  Gastrointestinal: BS+, soft, NT/ND  Extremities: No cyanosis or clubbing. No peripheral edema  : positive tipton   Skin: No rashes    LABS:  04-01    136  |  88<L>  |  91<HH>  ----------------------------<  418<H>  4.5   |  25  |  7.4<HH>    Ca    6.1<L>      01 Apr 2020 08:10  Phos  13.1     04-01  Mg     2.7     04-01    TPro  4.5<L>  /  Alb  2.0<L>  /  TBili  <0.2  /  DBili  x   /  AST  68<H>  /  ALT  29  /  AlkPhos  68  04-01 03-31    140  |  97<L>  |  82<HH>  ----------------------------<  172<H>  4.2   |  23  |  6.9<HH>    Ca    6.4<L>      31 Mar 2020 08:30  Phos  TNP     03-31  Mg     TNP     03-31    TPro  4.6<L>  /  Alb  2.6<L>  /  TBili  <0.2  /  DBili      /  AST  58<H>  /  ALT  25  /  AlkPhos  41  03-31                          9.9    15.17 )-----------( 177      ( 01 Apr 2020 08:10 )             31.9       Urine Studies:      RADIOLOGY & ADDITIONAL STUDIES:

## 2020-04-02 VITALS — RESPIRATION RATE: 25 BRPM | DIASTOLIC BLOOD PRESSURE: 55 MMHG | HEART RATE: 68 BPM | SYSTOLIC BLOOD PRESSURE: 89 MMHG

## 2020-04-02 LAB
ALBUMIN SERPL ELPH-MCNC: 1.7 G/DL — LOW (ref 3.5–5.2)
ALP SERPL-CCNC: 90 U/L — SIGNIFICANT CHANGE UP (ref 30–115)
ALT FLD-CCNC: 41 U/L — SIGNIFICANT CHANGE UP (ref 0–41)
ANION GAP SERPL CALC-SCNC: 24 MMOL/L — HIGH (ref 7–14)
APTT BLD: 86 SEC — CRITICAL HIGH (ref 27–39.2)
AST SERPL-CCNC: 131 U/L — HIGH (ref 0–41)
BASOPHILS # BLD AUTO: 0.04 K/UL — SIGNIFICANT CHANGE UP (ref 0–0.2)
BASOPHILS NFR BLD AUTO: 0.2 % — SIGNIFICANT CHANGE UP (ref 0–1)
BILIRUB SERPL-MCNC: 0.2 MG/DL — SIGNIFICANT CHANGE UP (ref 0.2–1.2)
BUN SERPL-MCNC: 109 MG/DL — CRITICAL HIGH (ref 10–20)
CALCIUM SERPL-MCNC: 6.1 MG/DL — LOW (ref 8.5–10.1)
CHLORIDE SERPL-SCNC: 86 MMOL/L — LOW (ref 98–110)
CO2 SERPL-SCNC: 26 MMOL/L — SIGNIFICANT CHANGE UP (ref 17–32)
CREAT SERPL-MCNC: 8.6 MG/DL — CRITICAL HIGH (ref 0.7–1.5)
CRP SERPL-MCNC: 35.02 MG/DL — HIGH (ref 0–0.4)
CRP SERPL-MCNC: 40.42 MG/DL — HIGH (ref 0–0.4)
D DIMER BLD IA.RAPID-MCNC: 1060 NG/ML DDU — HIGH (ref 0–230)
EOSINOPHIL # BLD AUTO: 0 K/UL — SIGNIFICANT CHANGE UP (ref 0–0.7)
EOSINOPHIL NFR BLD AUTO: 0 % — SIGNIFICANT CHANGE UP (ref 0–8)
GLUCOSE BLDC GLUCOMTR-MCNC: 262 MG/DL — HIGH (ref 70–99)
GLUCOSE BLDC GLUCOMTR-MCNC: 355 MG/DL — HIGH (ref 70–99)
GLUCOSE BLDC GLUCOMTR-MCNC: 378 MG/DL — HIGH (ref 70–99)
GLUCOSE SERPL-MCNC: 400 MG/DL — HIGH (ref 70–99)
HCT VFR BLD CALC: 30.9 % — LOW (ref 42–52)
HGB BLD-MCNC: 9.8 G/DL — LOW (ref 14–18)
IMM GRANULOCYTES NFR BLD AUTO: 3 % — HIGH (ref 0.1–0.3)
INR BLD: 1.21 RATIO — SIGNIFICANT CHANGE UP (ref 0.65–1.3)
LDH SERPL L TO P-CCNC: 789 U/L — HIGH (ref 50–242)
LYMPHOCYTES # BLD AUTO: 0.23 K/UL — LOW (ref 1.2–3.4)
LYMPHOCYTES # BLD AUTO: 1.3 % — LOW (ref 20.5–51.1)
MAGNESIUM SERPL-MCNC: 2.9 MG/DL — HIGH (ref 1.8–2.4)
MCHC RBC-ENTMCNC: 30.1 PG — SIGNIFICANT CHANGE UP (ref 27–31)
MCHC RBC-ENTMCNC: 31.7 G/DL — LOW (ref 32–37)
MCV RBC AUTO: 94.8 FL — HIGH (ref 80–94)
MONOCYTES # BLD AUTO: 0.38 K/UL — SIGNIFICANT CHANGE UP (ref 0.1–0.6)
MONOCYTES NFR BLD AUTO: 2.1 % — SIGNIFICANT CHANGE UP (ref 1.7–9.3)
NEUTROPHILS # BLD AUTO: 16.8 K/UL — HIGH (ref 1.4–6.5)
NEUTROPHILS NFR BLD AUTO: 93.4 % — HIGH (ref 42.2–75.2)
NRBC # BLD: 0 /100 WBCS — SIGNIFICANT CHANGE UP (ref 0–0)
NT-PROBNP SERPL-SCNC: 5836 PG/ML — HIGH (ref 0–300)
PHOSPHATE SERPL-MCNC: 15.9 MG/DL — HIGH (ref 2.1–4.9)
PLATELET # BLD AUTO: 124 K/UL — LOW (ref 130–400)
POTASSIUM SERPL-MCNC: 5.2 MMOL/L — HIGH (ref 3.5–5)
POTASSIUM SERPL-SCNC: 5.2 MMOL/L — HIGH (ref 3.5–5)
PROCALCITONIN SERPL-MCNC: 47.8 NG/ML — HIGH (ref 0.02–0.1)
PROT SERPL-MCNC: 5 G/DL — LOW (ref 6–8)
PROTHROM AB SERPL-ACNC: 13.9 SEC — HIGH (ref 9.95–12.87)
RBC # BLD: 3.26 M/UL — LOW (ref 4.7–6.1)
RBC # FLD: 14.6 % — HIGH (ref 11.5–14.5)
SODIUM SERPL-SCNC: 136 MMOL/L — SIGNIFICANT CHANGE UP (ref 135–146)
WBC # BLD: 17.99 K/UL — HIGH (ref 4.8–10.8)
WBC # FLD AUTO: 17.99 K/UL — HIGH (ref 4.8–10.8)

## 2020-04-02 PROCEDURE — 99232 SBSQ HOSP IP/OBS MODERATE 35: CPT

## 2020-04-02 PROCEDURE — 93010 ELECTROCARDIOGRAM REPORT: CPT

## 2020-04-02 PROCEDURE — 71045 X-RAY EXAM CHEST 1 VIEW: CPT | Mod: 26,77

## 2020-04-02 PROCEDURE — 71045 X-RAY EXAM CHEST 1 VIEW: CPT | Mod: 26

## 2020-04-02 RX ORDER — MEROPENEM 1 G/30ML
500 INJECTION INTRAVENOUS EVERY 8 HOURS
Refills: 0 | Status: DISCONTINUED | OUTPATIENT
Start: 2020-04-02 | End: 2020-04-02

## 2020-04-02 RX ORDER — SODIUM CHLORIDE 9 MG/ML
1000 INJECTION, SOLUTION INTRAVENOUS
Refills: 0 | Status: DISCONTINUED | OUTPATIENT
Start: 2020-04-02 | End: 2020-04-02

## 2020-04-02 RX ORDER — INSULIN HUMAN 100 [IU]/ML
10 INJECTION, SOLUTION SUBCUTANEOUS ONCE
Refills: 0 | Status: COMPLETED | OUTPATIENT
Start: 2020-04-02 | End: 2020-04-02

## 2020-04-02 RX ORDER — VANCOMYCIN HCL 1 G
500 VIAL (EA) INTRAVENOUS DAILY
Refills: 0 | Status: DISCONTINUED | OUTPATIENT
Start: 2020-04-02 | End: 2020-04-02

## 2020-04-02 RX ORDER — FUROSEMIDE 40 MG
48 TABLET ORAL
Qty: 500 | Refills: 0 | Status: DISCONTINUED | OUTPATIENT
Start: 2020-04-02 | End: 2020-04-02

## 2020-04-02 RX ADMIN — Medication 650 MILLIGRAM(S): at 05:41

## 2020-04-02 RX ADMIN — Medication 200 MILLIGRAM(S): at 18:31

## 2020-04-02 RX ADMIN — CHLORHEXIDINE GLUCONATE 1 APPLICATION(S): 213 SOLUTION TOPICAL at 06:02

## 2020-04-02 RX ADMIN — CISATRACURIUM BESYLATE 11.7 MICROGRAM(S)/KG/MIN: 2 INJECTION INTRAVENOUS at 05:39

## 2020-04-02 RX ADMIN — SODIUM CHLORIDE 100 MILLILITER(S): 9 INJECTION, SOLUTION INTRAVENOUS at 12:01

## 2020-04-02 RX ADMIN — Medication 6: at 18:29

## 2020-04-02 RX ADMIN — MEROPENEM 100 MILLIGRAM(S): 1 INJECTION INTRAVENOUS at 13:29

## 2020-04-02 RX ADMIN — Medication 100 MILLIGRAM(S): at 14:32

## 2020-04-02 RX ADMIN — Medication 60 MILLIGRAM(S): at 05:43

## 2020-04-02 RX ADMIN — Medication 400 MILLIGRAM(S): at 05:41

## 2020-04-02 RX ADMIN — Medication 650 MILLIGRAM(S): at 18:31

## 2020-04-02 RX ADMIN — Medication 200 MILLIGRAM(S): at 05:41

## 2020-04-02 RX ADMIN — Medication 25 MICROGRAM(S): at 05:42

## 2020-04-02 RX ADMIN — Medication 60 MILLIGRAM(S): at 18:30

## 2020-04-02 RX ADMIN — HEPARIN SODIUM 5000 UNIT(S): 5000 INJECTION INTRAVENOUS; SUBCUTANEOUS at 05:43

## 2020-04-02 RX ADMIN — VASOPRESSIN 1.2 UNIT(S)/MIN: 20 INJECTION INTRAVENOUS at 06:06

## 2020-04-02 RX ADMIN — MYCOPHENOLATE MOFETIL 500 MILLIGRAM(S): 250 CAPSULE ORAL at 05:41

## 2020-04-02 RX ADMIN — Medication 400 MILLIGRAM(S): at 18:31

## 2020-04-02 RX ADMIN — Medication 1 MILLIGRAM(S): at 12:02

## 2020-04-02 RX ADMIN — Medication 10: at 06:01

## 2020-04-02 RX ADMIN — PROPOFOL 3.9 MICROGRAM(S)/KG/MIN: 10 INJECTION, EMULSION INTRAVENOUS at 12:45

## 2020-04-02 RX ADMIN — Medication 24 MG/HR: at 18:44

## 2020-04-02 RX ADMIN — MYCOPHENOLATE MOFETIL 500 MILLIGRAM(S): 250 CAPSULE ORAL at 18:31

## 2020-04-02 RX ADMIN — Medication 6.09 MICROGRAM(S)/KG/MIN: at 06:06

## 2020-04-02 RX ADMIN — HEPARIN SODIUM 5000 UNIT(S): 5000 INJECTION INTRAVENOUS; SUBCUTANEOUS at 13:30

## 2020-04-02 RX ADMIN — Medication 48 MILLIGRAM(S): at 12:02

## 2020-04-02 RX ADMIN — Medication 6.09 MICROGRAM(S)/KG/MIN: at 12:03

## 2020-04-02 NOTE — PROGRESS NOTE ADULT - ASSESSMENT
74M PMHx heart transplant (13 years ago) , HTN, DM2, CKD, HLD, COPD, hypothyroidism recently admitted for cough and SOB diagnosed with COVID-19 discharged in stable condition presents ER for evaluation of worsening SOB found to be in hypoxic respiratory failure s/p intubation.     IMPRESSION:  Acute Hypoxic Respiratory Failure/ Severe ARDS  COVID-19 PNA  Acute on Chronic Kidney Failure (CKD V)  Septic Shock, Multi Organ Failure      PLAN:    CNS: Sedated on Propofol and Nimbex, n    HEENT: Oral care    PULMONARY:  HOB @ 45 degrees.  Vent setting adjusted, with low saturations of SP02 45 % on FIo2 100    CARDIOVASCULAR: Lasix infusion,     GI: GI prophylaxis. Hold tube feeding    RENAL: Worsening uremia, High Anion Gap metabolic acidosis, patient requires dialysis, however, per nephrology, dialysis is not an option currently given the patient's hypoxia    INFECTIOUS DISEASE: BCX negative, c/w HCQ, started on Meropenem and vancomycin    HEMATOLOGICAL:  DVT prophylaxis.    ENDOCRINE: c/w solumedrol 60 mg q12 day #3    CODE: full  Grave prognosis, remains full code

## 2020-04-02 NOTE — PROGRESS NOTE ADULT - SUBJECTIVE AND OBJECTIVE BOX
Patient is a 74y old  Male who presents with a chief complaint of Hypoxia   COVID-19 + (02 Apr 2020 20:55)        Over Night Events: Patient was reported to be desaturating on 100% FIO2 overnight. Code Blue x2 this afternoon. Patient remains full code per daughter. Will continue to monitor        ROS:  See HPI    PHYSICAL EXAM    ICU Vital Signs Last 24 Hrs  T(C): 36 (02 Apr 2020 04:00), Max: 36 (02 Apr 2020 00:00)  T(F): 96.8 (02 Apr 2020 04:00), Max: 96.8 (02 Apr 2020 00:00)  HR: 68 (02 Apr 2020 17:34) (68 - 112)  BP: 89/55 (02 Apr 2020 17:34) (89/55 - 128/48)  BP(mean): 68 (02 Apr 2020 11:49) (68 - 68)  ABP: --  ABP(mean): --  RR: 25 (02 Apr 2020 17:34) (20 - 25)  SpO2: 68% (02 Apr 2020 08:40) (65% - 90%)      04-01-20 @ 07:01  -  04-02-20 @ 07:00  --------------------------------------------------------  IN:    cisatracurium Infusion: 85.8 mL    Enteral Tube Flush: 100 mL    Glucerna: 1080 mL    norepinephrine Infusion: 1154 mL    propofol Infusion: 202.8 mL    vasopressin Infusion: 40.4 mL  Total IN: 2663 mL    OUT:    Indwelling Catheter - Urethral: 1200 mL    Stool: 1 mL  Total OUT: 1201 mL    Total NET: 1462 mL      04-02-20 @ 07:01  -  04-02-20 @ 21:01  --------------------------------------------------------  IN:  Total IN: 0 mL    OUT:    Indwelling Catheter - Urethral: 25 mL  Total OUT: 25 mL    Total NET: -25 mL          LABS:                            9.8    17.99 )-----------( 124      ( 02 Apr 2020 05:09 )             30.9                                               04-02    136  |  86<L>  |  109<HH>  ----------------------------<  400<H>  5.2<H>   |  26  |  8.6<HH>    Ca    6.1<L>      02 Apr 2020 05:09  Phos  15.9     04-02  Mg     2.9     04-02    TPro  5.0<L>  /  Alb  1.7<L>  /  TBili  0.2  /  DBili  x   /  AST  131<H>  /  ALT  41  /  AlkPhos  90  04-02      PT/INR - ( 02 Apr 2020 08:26 )   PT: 13.90 sec;   INR: 1.21 ratio         PTT - ( 02 Apr 2020 08:26 )  PTT:86.0 sec                                                                                     LIVER FUNCTIONS - ( 02 Apr 2020 05:09 )  Alb: 1.7 g/dL / Pro: 5.0 g/dL / ALK PHOS: 90 U/L / ALT: 41 U/L / AST: 131 U/L / GGT: x                                                                                               Mode: AC/ CMV (Assist Control/ Continuous Mandatory Ventilation)  RR (machine): 25  TV (machine): 400  FiO2: 100  PEEP: 15  MAP: 26  PIP: 43                                      ABG - ( 02 Apr 2020 04:42 )  pH, Arterial: 7.08  pH, Blood: x     /  pCO2: 92    /  pO2: 44    / HCO3: 27    / Base Excess: -4.7  /  SaO2: 62                  MEDICATIONS  (STANDING):  atorvastatin 40 milliGRAM(s) Oral at bedtime  chlorhexidine 4% Liquid 1 Application(s) Topical <User Schedule>  cisatracurium Infusion 3 MICROgram(s)/kG/Min (11.7 mL/Hr) IV Continuous <Continuous>  dextrose 5% 1000 milliLiter(s) (100 mL/Hr) IV Continuous <Continuous>  dextrose 5%. 1000 milliLiter(s) (50 mL/Hr) IV Continuous <Continuous>  dextrose 50% Injectable 12.5 Gram(s) IV Push once  dextrose 50% Injectable 25 Gram(s) IV Push once  dextrose 50% Injectable 25 Gram(s) IV Push once  etomidate Injectable 20 milliGRAM(s) IV Push once  fenofibrate Tablet 48 milliGRAM(s) Oral daily  folic acid 1 milliGRAM(s) Oral daily  furosemide Infusion 48 mG/Hr (24 mL/Hr) IV Continuous <Continuous>  heparin  Injectable 5000 Unit(s) SubCutaneous every 8 hours  hydroxychloroquine   Oral   hydroxychloroquine 200 milliGRAM(s) Oral every 12 hours  insulin glargine Injectable (LANTUS) 10 Unit(s) SubCutaneous at bedtime  insulin lispro (HumaLOG) corrective regimen sliding scale   SubCutaneous every 6 hours  levothyroxine 25 MICROGram(s) Oral daily  meropenem  IVPB 500 milliGRAM(s) IV Intermittent every 8 hours  methylPREDNISolone sodium succinate Injectable 60 milliGRAM(s) IV Push every 12 hours  mycophenolate mofetil 500 milliGRAM(s) Oral two times a day  norepinephrine Infusion 0.1 MICROgram(s)/kG/Min (6.09 mL/Hr) IV Continuous <Continuous>  pentoxifylline 400 milliGRAM(s) Oral at bedtime  pentoxifylline 400 milliGRAM(s) Oral two times a day  propofol Infusion 10 MICROgram(s)/kG/Min (3.9 mL/Hr) IV Continuous <Continuous>  senna 2 Tablet(s) Oral at bedtime  sodium bicarbonate 650 milliGRAM(s) Oral two times a day  vancomycin  IVPB 500 milliGRAM(s) IV Intermittent daily  vasopressin Infusion 0.02 Unit(s)/Min (1.2 mL/Hr) IV Continuous <Continuous>    MEDICATIONS  (PRN):  dextrose 40% Gel 15 Gram(s) Oral once PRN Blood Glucose LESS THAN 70 milliGRAM(s)/deciliter  glucagon  Injectable 1 milliGRAM(s) IntraMuscular once PRN Glucose LESS THAN 70 milligrams/deciliter      Xrays:                                                                                     ECHO

## 2020-04-02 NOTE — PROGRESS NOTE ADULT - SUBJECTIVE AND OBJECTIVE BOX
24 h events noted  intubated/ventilated         PAST HISTORY  --------------------------------------------------------------------------------  No significant changes to PMH, PSH, FHx, SHx, unless otherwise noted    ALLERGIES & MEDICATIONS  --------------------------------------------------------------------------------  Allergies    No Known Allergies    Intolerances      Standing Inpatient Medications  atorvastatin 40 milliGRAM(s) Oral at bedtime  chlorhexidine 4% Liquid 1 Application(s) Topical <User Schedule>  cisatracurium Infusion 3 MICROgram(s)/kG/Min IV Continuous <Continuous>  dextrose 5% 1000 milliLiter(s) IV Continuous <Continuous>  dextrose 5%. 1000 milliLiter(s) IV Continuous <Continuous>  dextrose 50% Injectable 12.5 Gram(s) IV Push once  dextrose 50% Injectable 25 Gram(s) IV Push once  dextrose 50% Injectable 25 Gram(s) IV Push once  etomidate Injectable 20 milliGRAM(s) IV Push once  fenofibrate Tablet 48 milliGRAM(s) Oral daily  folic acid 1 milliGRAM(s) Oral daily  heparin  Injectable 5000 Unit(s) SubCutaneous every 8 hours  hydroxychloroquine   Oral   hydroxychloroquine 200 milliGRAM(s) Oral every 12 hours  insulin glargine Injectable (LANTUS) 10 Unit(s) SubCutaneous at bedtime  insulin lispro (HumaLOG) corrective regimen sliding scale   SubCutaneous every 6 hours  insulin regular  human recombinant. 10 Unit(s) IV Push once  levothyroxine 25 MICROGram(s) Oral daily  meropenem  IVPB 500 milliGRAM(s) IV Intermittent every 8 hours  methylPREDNISolone sodium succinate Injectable 60 milliGRAM(s) IV Push every 12 hours  mycophenolate mofetil 500 milliGRAM(s) Oral two times a day  norepinephrine Infusion 0.1 MICROgram(s)/kG/Min IV Continuous <Continuous>  pentoxifylline 400 milliGRAM(s) Oral at bedtime  pentoxifylline 400 milliGRAM(s) Oral two times a day  propofol Infusion 10 MICROgram(s)/kG/Min IV Continuous <Continuous>  senna 2 Tablet(s) Oral at bedtime  sodium bicarbonate 650 milliGRAM(s) Oral two times a day  vasopressin Infusion 0.02 Unit(s)/Min IV Continuous <Continuous>    PRN Inpatient Medications  dextrose 40% Gel 15 Gram(s) Oral once PRN  glucagon  Injectable 1 milliGRAM(s) IntraMuscular once PRN          VITALS/PHYSICAL EXAM  --------------------------------------------------------------------------------  T(C): 36 (04-02-20 @ 04:00), Max: 37.6 (04-01-20 @ 21:00)  HR: 104 (04-02-20 @ 04:00) (101 - 126)  BP: 110/46 (04-02-20 @ 04:00) (108/44 - 128/48)  RR: 24 (04-02-20 @ 04:00) (20 - 24)  SpO2: 73% (04-02-20 @ 04:00) (70% - 90%)  Wt(kg): --        04-01-20 @ 07:01  -  04-02-20 @ 07:00  --------------------------------------------------------  IN: 2663 mL / OUT: 1201 mL / NET: 1462 mL      Physical Exam:  	Gen:intubated/ventilated         LABS/STUDIES  --------------------------------------------------------------------------------              9.8    17.99 >-----------<  124      [04-02-20 @ 05:09]              30.9     136  |  86  |  109  ----------------------------<  400      [04-02-20 @ 05:09]  5.2   |  26  |  8.6        Ca     6.1     [04-02-20 @ 05:09]      Mg     2.9     [04-02-20 @ 05:09]      Phos  15.9     [04-02-20 @ 05:09]    TPro  5.0  /  Alb  1.7  /  TBili  0.2  /  DBili  x   /  AST  131  /  ALT  41  /  AlkPhos  90  [04-02-20 @ 05:09]    PT/INR: PT 13.90, INR 1.21       [04-02-20 @ 08:26]  PTT: 86.0       [04-02-20 @ 08:26]          [04-02-20 @ 05:09]    Creatinine Trend:  SCr 8.6 [04-02 @ 05:09]  SCr 7.4 [04-01 @ 08:10]  SCr 6.9 [03-31 @ 08:30]  SCr 6.5 [03-31 @ 07:25]  SCr TNP [03-31 @ 05:30]        Ferritin [id number]      [03-31-20 @ 08:30]  HbA1c 7.1      [03-25-20 @ 06:49]

## 2020-04-02 NOTE — PROGRESS NOTE ADULT - ASSESSMENT
74y M admitted with RESPIRATORY FAILURE; CKD; CORONAVIRUS INFECTION    HPI:  74M PMHx heart transplant, HTN, DM2, CKD, HLD, COPD, hypothyroidism recently admitted for cough and SOB diagnosed with COVID-19 discharged in stable condition presents ER for evaluation of worsening SOB. Pt currenlty intubated and sedated. Per ED Pt had worsening respiratory effort, cough, fatigue, unable to speak full sentences. Per Patient's Cardiac Transplant Physician: Dr. Donnelly: [phone]- requesting that we stop Cellcept.    PROBLEMS  Severe Sepsis (Pulse>90, Resp Rate>20), lactic acidosis due to COVID-19 Pneumonia    New problem with additional W/U   acute illness which poses threat to bodily function     - T(F): , Max: 99.6   - WBC Count: 17.99 K/uL    3/30 Cxray Bilateral patchy airspace opacifications with somewhat improved aeration on the left upper lung.  4/1 Cxray with  Unchanged patchy bilateral opacities.     On hydroxychloroquine 200 milliGRAM(s) Oral every 12 hours  meropenem  IVPB 500 milliGRAM(s) IV Intermittent every 8 hours  vancomycin  IVPB 500 milliGRAM(s) IV Intermittent daily    3/30 QTC Calculation(Bezet) 444 ms  4/1 QTC Calculation(Bezet) 492 ms      PLAN  Complete 8 dose course of hydroxychloroquine 200 milliGRAM(s) Oral every 12 hours  Watch QTC closely (daily)  Palliative care F/U

## 2020-04-02 NOTE — PROGRESS NOTE ADULT - ASSESSMENT
74M PMHx heart transplant (13 years ago) , HTN, DM2, CKD, HLD, COPD, hypothyroidism recently admitted for cough and SOB diagnosed with COVID-19 discharged in stable condition presents ER for evaluation of worsening SOB found to be in hypoxic respiratory failure s/p intubation.    # JUAN JOSE / stage 4 ckd , baseline creatinine in the 3 range  # ?ATN in nature   #   cc overnight  # DC bicarb   # i spoke with Dr pérez [phone] , off everolimus, on  cellcept 500 q 12, on stress dose steroids   # covid positive, intubated on HCQ  # check repeat labs, corrected calcium 8.4  # will insert U dall for HD if stable  ( severe hypoxia)  # overall prognosis poor

## 2020-04-02 NOTE — DISCHARGE NOTE FOR THE EXPIRED PATIENT - HOSPITAL COURSE
74M PMHx heart transplant, HTN, DM2, CKD, HLD, COPD, hypothyroidism recently admitted for cough and SOB diagnosed with COVID-19 discharged in stable condition a week prior presented ER for evaluation of worsening SOB. Patient found to be acute hypoxic respiratory failure secondary to COVID and severe ARDS requiring mechanical ventillation. He was also found to be in Acute on chronic kidney injury requiring dialysis, however deemed not stable for udall placement. Patient subsequently developed septic shock requring two pressors and desbite being paralyzed, sedated, with adequate vent changes, and on 100% FIO2 patietn  was desaturating to 20 SPO2, coded twice with ROSC, and third time, patient succumbed to respiratory arrest. TOD 20:53

## 2020-04-02 NOTE — PROGRESS NOTE ADULT - REASON FOR ADMISSION
Hypoxia   COVID-19 +

## 2020-04-02 NOTE — PROGRESS NOTE ADULT - ASSESSMENT
IMPRESSION:    Acute hypoxic resp failure/ COVID +/ ARDS  ho Heart transplant  Acute on chronic renal  failure   Sepsis   Septic shock   SP CPA    PLAN:    CNS: Assure adequate sedation.  Paralysis D#2     HEENT:  Oral care    PULMONARY:  HOB @ 45 degrees, vent changes:  .  RR 32.  PEEP 15.      CARDIOVASCULAR: Avoid volume overload.  Monitor QTC.       GI: GI prophylaxis                     Hold  tube feeds     RENAL:  F/u  lytes.  Correct as needed.      INFECTIOUS DISEASE: PLAQUENIL, Audra and VAnc     HEMATOLOGICAL:  DVT prophylaxis.    ENDOCRINE:  Follow up FS.  Insulin protocol if needed.  Solumedrol  60 bid D#3    CODE STATUS: FULL CODE    DISPOSITION: Pt requires continued monitoring in the MICU  palliative care  Extremely poor prognosis   DW daughter at bed side

## 2020-04-02 NOTE — PROGRESS NOTE ADULT - SUBJECTIVE AND OBJECTIVE BOX
TANK RODRIGUEZ  74y, Male  Allergy: No Known Allergies      CHIEF COMPLAINT: Hypoxia   COVID-19 + (02 Apr 2020 10:23)      INTERVAL EVENTS/HPI  - No acute events overnight  - T(F): , Max: 99.6 (04-01-20 @ 21:00)  - Denies any worsening symptoms  - Tolerating medication  - WBC Count: 17.99 K/uL (04-02-20 @ 05:09)      ROS  unable to obtain history secondary to patient's mental status and/or sedation     FH non-contributory   Social Hx non-contributory    VITALS:  T(F): 96.8, Max: 99.6 (04-01-20 @ 21:00)  HR: 104  BP: 110/46  RR: 24Vital Signs Last 24 Hrs  T(C): 36 (02 Apr 2020 04:00), Max: 37.6 (01 Apr 2020 21:00)  T(F): 96.8 (02 Apr 2020 04:00), Max: 99.6 (01 Apr 2020 21:00)  HR: 104 (02 Apr 2020 04:00) (101 - 126)  BP: 110/46 (02 Apr 2020 04:00) (108/44 - 128/48)  BP(mean): 74 (01 Apr 2020 16:25) (74 - 79)  RR: 24 (02 Apr 2020 04:00) (20 - 24)  SpO2: 68% (02 Apr 2020 08:40) (68% - 90%)    PHYSICAL EXAM:  see below       TESTS & MEASUREMENTS:                        9.8    17.99 )-----------( 124      ( 02 Apr 2020 05:09 )             30.9     04-02    136  |  86<L>  |  109<HH>  ----------------------------<  400<H>  5.2<H>   |  26  |  8.6<HH>    Ca    6.1<L>      02 Apr 2020 05:09  Phos  15.9     04-02  Mg     2.9     04-02    TPro  5.0<L>  /  Alb  1.7<L>  /  TBili  0.2  /  DBili  x   /  AST  131<H>  /  ALT  41  /  AlkPhos  90  04-02    eGFR if Non African American: 5 mL/min/1.73M2 (04-02-20 @ 05:09)  eGFR if African American: 6 mL/min/1.73M2 (04-02-20 @ 05:09)    LIVER FUNCTIONS - ( 02 Apr 2020 05:09 )  Alb: 1.7 g/dL / Pro: 5.0 g/dL / ALK PHOS: 90 U/L / ALT: 41 U/L / AST: 131 U/L / GGT: x               Culture - Blood (collected 03-29-20 @ 23:45)  Source: .Blood Blood-Peripheral  Preliminary Report (03-31-20 @ 06:02):    No growth to date.    Culture - Blood (collected 03-29-20 @ 23:45)  Source: .Blood Blood-Peripheral  Preliminary Report (03-31-20 @ 06:02):    No growth to date.    GI PCR Panel, Stool (collected 03-26-20 @ 11:07)  Source: .Stool Feces  Final Report (03-27-20 @ 00:47):    GI PCR Results: NOT detected    *******Please Note:*******    GI panel PCR evaluates for:    Campylobacter, Plesiomonas shigelloides, Salmonella,    Vibrio, Yersinia enterocolitica, Enteroaggregative    Escherichia coli (EAEC), Enteropathogenic E.coli (EPEC),    Enterotoxigenic E. coli (ETEC) lt/st, Shiga-like    toxin-producing E. coli (STEC) stx1/stx2,    Shigella/ Enteroinvasive E. coli (EIEC), Cryptosporidium,    Cyclospora cayetanensis, Entamoeba histolytica,    Giardia lamblia, Adenovirus F 40/41, Astrovirus,    Norovirus GI/GII, Rotavirus A, Sapovirus        Blood Gas Venous - Lactate: 2.7 mmoL/L (03-29-20 @ 23:39)      INFECTIOUS DISEASES TESTING  MRSA PCR Result.: Negative (04-01-20 @ 09:04)      RADIOLOGY & ADDITIONAL TESTS:  I have personally reviewed the last Chest xray  CXR  Xray Chest 1 View- PORTABLE-Urgent:   EXAM:  XR CHEST PORTABLE URGENT 1V            PROCEDURE DATE:  03/31/2020            INTERPRETATION:  CLINICAL INDICATION: Respiratory failure, hypoxia    COMPARISON: Chest radiograph dated 3/31/2020 performed at 5:44 AM    TECHNIQUE: Frontal radiograph the chest.     FINDINGS:    Support devices: ET tube terminates above the pratik. Enteric tube courses below left hemidiaphragm. Stable left IJ central venous catheter.    Cardiac/mediastinum/hilum: Stable.    Lung parenchyma/Pleura: Unchanged patchy bilateral opacities. There is no  pneumothorax. There is blunting of the left costophrenic angle.    Skeleton/soft tissues: Stable.    IMPRESSION:    Unchanged patchy bilateral opacities.    Mild blunting of the left costophrenic angle is compatible with a small effusion.                      MARY VIDAL M.D., ATTENDING RADIOLOGIST  This document has been electronically signed. Mar 31 2020  9:08PM             (03-31-20 @ 20:18)  Xray Chest 1 View- PORTABLE-Urgent:   EXAM:  XR CHEST PORTABLE URGENT 1V            PROCEDURE DATE:  03/30/2020            INTERPRETATION:  Clinical History / Reason for exam: Nasogastric tube.    Comparison : Chest radiograph 3/30/2020.    Technique/Positioning: AP view the chest.    Findings:    Support devices: Endotracheal tube with tip at the pratik. Left-sided central line with tip overlying the brachiocephalic vein.    Cardiac/mediastinum/hilum: Cardiomegaly.    Lung parenchyma/Pleura: Bilateral patchy airspace opacifications with somewhat improved aeration on the left upper lung.    Skeleton/soft tissues: Stable..    Impression:      Bilateral patchy airspace opacifications with somewhat improved aeration on the left upper lung.                  JEFF GOMEZ M.D., ATTENDING RADIOLOGIST  This document has been electronically signed. Mar 30 2020  3:27PM             (03-30-20 @ 14:22)      CT      CARDIOLOGY TESTING  12 Lead ECG:   Ventricular Rate 119 BPM    Atrial Rate 119 BPM    P-R Interval 144 ms    QRS Duration 86 ms    Q-T Interval 350 ms    QTC Calculation(Bezet) 492 ms    P Axis 56 degrees    R Axis 71 degrees    T Axis 41 degrees    Diagnosis Line Sinus tachycardia  Nonspecific ST abnormality  Abnormal ECG    Confirmed by Issac Deshpande ([id number]) on 4/1/2020 1:12:14 PM (04-01-20 @ 08:31)  12 Lead ECG:   Ventricular Rate 132 BPM    Atrial Rate 132 BPM    P-R Interval 128 ms    QRS Duration 106 ms    Q-T Interval 300 ms    QTC Calculation(Bezet) 444 ms    P Axis 45 degrees    R Axis 65 degrees    T Axis 56 degrees    Diagnosis Line Sinus tachycardia  Cannot rule out Septal infarct , age undetermined  Abnormal ECG    Confirmed by Rere Dobbs MD ([id number]) on 3/30/2020 9:07:57 AM (03-30-20 @ 01:33)      MEDICATIONS  atorvastatin 40  chlorhexidine 4% Liquid 1  cisatracurium Infusion 3  dextrose 5% 1000  dextrose 5%. 1000  dextrose 50% Injectable 12.5  dextrose 50% Injectable 25  dextrose 50% Injectable 25  etomidate Injectable 20  fenofibrate Tablet 48  folic acid 1  heparin  Injectable 5000  hydroxychloroquine   hydroxychloroquine 200  insulin glargine Injectable (LANTUS) 10  insulin lispro (HumaLOG) corrective regimen sliding scale   insulin regular  human recombinant. 10  levothyroxine 25  meropenem  IVPB 500  methylPREDNISolone sodium succinate Injectable 60  mycophenolate mofetil 500  norepinephrine Infusion 0.1  pentoxifylline 400  pentoxifylline 400  propofol Infusion 10  senna 2  sodium bicarbonate 650  vancomycin  IVPB 500  vasopressin Infusion 0.02      ANTIBIOTICS:  hydroxychloroquine   Oral   hydroxychloroquine 200 milliGRAM(s) Oral every 12 hours  meropenem  IVPB 500 milliGRAM(s) IV Intermittent every 8 hours  vancomycin  IVPB 500 milliGRAM(s) IV Intermittent daily      All available historical data has been reviewed

## 2020-04-02 NOTE — PROGRESS NOTE ADULT - SUBJECTIVE AND OBJECTIVE BOX
Patient is a 74y old  Male who presents with a chief complaint of Hypoxia   COVID-19 + (02 Apr 2020 09:21)        Over Night Events:  Remains on MV.  SP Code blue about 5 min.  On Levophed and Vaso.  Sedated and paralyzed.          ROS:     ROS all negative except per HPI     ALLERGIC/IMMUNOLOGIC:   No active allergic or immunologic issues        PHYSICAL EXAM    ICU Vital Signs Last 24 Hrs  T(C): 36 (02 Apr 2020 04:00), Max: 37.6 (01 Apr 2020 21:00)  T(F): 96.8 (02 Apr 2020 04:00), Max: 99.6 (01 Apr 2020 21:00)  HR: 104 (02 Apr 2020 04:00) (101 - 126)  BP: 110/46 (02 Apr 2020 04:00) (108/44 - 128/48)  BP(mean): 74 (01 Apr 2020 16:25) (74 - 79)  ABP: --  ABP(mean): --  RR: 24 (02 Apr 2020 04:00) (20 - 24)  SpO2: 68% (02 Apr 2020 08:40) (68% - 90%)      CONSTITUTIONAL:   Ill appearing.  Well nourished.      ENT:   Airway patent,   Mouth with normal mucosa.   No thrush    CARDIAC:   Tachy  Irregular rhythm.     edema      RESPIRATORY:   NO wheezing  Bilateral BS  Normal chest expansion  Not tachypneic,  No use of accessory muscles    GASTROINTESTINAL:  Abdomen soft,   Non-tender,   No guarding,   + BS    MUSCULOSKELETAL:   Range of motion is not limited,  No clubbing, cyanosis    NEUROLOGICAL:   Sedated and paralyzed     SKIN:   Skin normal color for race,   warm, dry   No evidence of rash.        04-01-20 @ 07:01  -  04-02-20 @ 07:00  --------------------------------------------------------  IN:    cisatracurium Infusion: 85.8 mL    Enteral Tube Flush: 100 mL    Glucerna: 1080 mL    norepinephrine Infusion: 1154 mL    propofol Infusion: 202.8 mL    vasopressin Infusion: 40.4 mL  Total IN: 2663 mL    OUT:    Indwelling Catheter - Urethral: 1200 mL    Stool: 1 mL  Total OUT: 1201 mL    Total NET: 1462 mL          LABS:                            9.8    17.99 )-----------( 124      ( 02 Apr 2020 05:09 )             30.9                                               04-02    136  |  86<L>  |  109<HH>  ----------------------------<  400<H>  5.2<H>   |  26  |  8.6<HH>    Ca    6.1<L>      02 Apr 2020 05:09  Phos  15.9     04-02  Mg     2.9     04-02    TPro  5.0<L>  /  Alb  1.7<L>  /  TBili  0.2  /  DBili  x   /  AST  131<H>  /  ALT  41  /  AlkPhos  90  04-02      PT/INR - ( 02 Apr 2020 08:26 )   PT: 13.90 sec;   INR: 1.21 ratio         PTT - ( 02 Apr 2020 08:26 )  PTT:86.0 sec                                                                                     LIVER FUNCTIONS - ( 02 Apr 2020 05:09 )  Alb: 1.7 g/dL / Pro: 5.0 g/dL / ALK PHOS: 90 U/L / ALT: 41 U/L / AST: 131 U/L / GGT: x                                                                                               Mode: AC/ CMV (Assist Control/ Continuous Mandatory Ventilation)  RR (machine): 26  TV (machine): 350  FiO2: 100  PEEP: 15  MAP: 23  PIP: 35                                      ABG - ( 02 Apr 2020 04:42 )  pH, Arterial: 7.08  pH, Blood: x     /  pCO2: 92    /  pO2: 44    / HCO3: 27    / Base Excess: -4.7  /  SaO2: 62                  MEDICATIONS  (STANDING):  atorvastatin 40 milliGRAM(s) Oral at bedtime  chlorhexidine 4% Liquid 1 Application(s) Topical <User Schedule>  cisatracurium Infusion 3 MICROgram(s)/kG/Min (11.7 mL/Hr) IV Continuous <Continuous>  dextrose 5% 1000 milliLiter(s) (100 mL/Hr) IV Continuous <Continuous>  dextrose 5%. 1000 milliLiter(s) (50 mL/Hr) IV Continuous <Continuous>  dextrose 50% Injectable 12.5 Gram(s) IV Push once  dextrose 50% Injectable 25 Gram(s) IV Push once  dextrose 50% Injectable 25 Gram(s) IV Push once  etomidate Injectable 20 milliGRAM(s) IV Push once  fenofibrate Tablet 48 milliGRAM(s) Oral daily  folic acid 1 milliGRAM(s) Oral daily  heparin  Injectable 5000 Unit(s) SubCutaneous every 8 hours  hydroxychloroquine   Oral   hydroxychloroquine 200 milliGRAM(s) Oral every 12 hours  insulin glargine Injectable (LANTUS) 10 Unit(s) SubCutaneous at bedtime  insulin lispro (HumaLOG) corrective regimen sliding scale   SubCutaneous every 6 hours  insulin regular  human recombinant. 10 Unit(s) IV Push once  levothyroxine 25 MICROGram(s) Oral daily  meropenem  IVPB 500 milliGRAM(s) IV Intermittent every 8 hours  methylPREDNISolone sodium succinate Injectable 60 milliGRAM(s) IV Push every 12 hours  mycophenolate mofetil 500 milliGRAM(s) Oral two times a day  norepinephrine Infusion 0.1 MICROgram(s)/kG/Min (6.09 mL/Hr) IV Continuous <Continuous>  pentoxifylline 400 milliGRAM(s) Oral at bedtime  pentoxifylline 400 milliGRAM(s) Oral two times a day  propofol Infusion 10 MICROgram(s)/kG/Min (3.9 mL/Hr) IV Continuous <Continuous>  senna 2 Tablet(s) Oral at bedtime  sodium bicarbonate 650 milliGRAM(s) Oral two times a day  vancomycin  IVPB 500 milliGRAM(s) IV Intermittent daily  vasopressin Infusion 0.02 Unit(s)/Min (1.2 mL/Hr) IV Continuous <Continuous>    MEDICATIONS  (PRN):  dextrose 40% Gel 15 Gram(s) Oral once PRN Blood Glucose LESS THAN 70 milliGRAM(s)/deciliter  glucagon  Injectable 1 milliGRAM(s) IntraMuscular once PRN Glucose LESS THAN 70 milligrams/deciliter      New X-rays reviewed:                                                                                  ECHO    CXR interpreted by me:  ET OK.  Bilateral infiltrates.  OG OK

## 2020-04-02 NOTE — CHART NOTE - NSCHARTNOTEFT_GEN_A_CORE
Called by RN around 10 am, as patient was found to be without pulse, Code Blue activated, patient was placed on monitor, found to be on PEA, given two rounds of Epinephrine, Lidocaine, bicarb push. Patient achieved ROSC Called by RN around 10 am, as patient was found to be without pulse, Code Blue activated, patient was placed on monitor, found to be on PEA, given two rounds of Epinephrine, Lidocaine, bicarb push. Patient achieved ROSC in 10 minutes. Called by RN around 10 am, as patient was found to be without pulse, Code Blue activated, patient was placed on monitor, found to be on PEA, given two rounds of Epinephrine, Lidocaine, bicarb push. Patient achieved ROSC in 10 minutes.    ADDENDUM  Second Gladys Workman called at around 3 pm, patient was found to be pulseless by RN , SPO2 45% on VENT 100 FIO2,  patient was given 2 rounds of Epinephrine and ROSC was achieved in 10 minutes. Patient SPO2 Called by RN around 10 am, as patient was found to be without pulse, Code Blue activated, patient was placed on monitor, found to be on PEA, given two rounds of Epinephrine, Lidocaine, bicarb push. Patient achieved ROSC in 10 minutes.    ADDENDUM  Second Gladys Workman called at around 3 pm, patient was found to be pulseless by RN , SPO2 45% on VENT 100 FIO2,  patient was given 2 rounds of Epinephrine and ROSC was achieved in 10 minutes. Patient SPO2 improved to 76% on 100 FIO2.

## 2020-04-03 LAB
CULTURE RESULTS: SIGNIFICANT CHANGE UP
CULTURE RESULTS: SIGNIFICANT CHANGE UP
FERRITIN SERPL-MCNC: 7421 NG/ML — HIGH (ref 30–400)
SPECIMEN SOURCE: SIGNIFICANT CHANGE UP
SPECIMEN SOURCE: SIGNIFICANT CHANGE UP

## 2020-04-09 DIAGNOSIS — J44.0 CHRONIC OBSTRUCTIVE PULMONARY DISEASE WITH (ACUTE) LOWER RESPIRATORY INFECTION: ICD-10-CM

## 2020-04-09 DIAGNOSIS — I24.8 OTHER FORMS OF ACUTE ISCHEMIC HEART DISEASE: ICD-10-CM

## 2020-04-09 DIAGNOSIS — B97.29 OTHER CORONAVIRUS AS THE CAUSE OF DISEASES CLASSIFIED ELSEWHERE: ICD-10-CM

## 2020-04-09 DIAGNOSIS — E78.5 HYPERLIPIDEMIA, UNSPECIFIED: ICD-10-CM

## 2020-04-09 DIAGNOSIS — Z79.4 LONG TERM (CURRENT) USE OF INSULIN: ICD-10-CM

## 2020-04-09 DIAGNOSIS — J12.89 OTHER VIRAL PNEUMONIA: ICD-10-CM

## 2020-04-09 DIAGNOSIS — Z94.1 HEART TRANSPLANT STATUS: ICD-10-CM

## 2020-04-09 DIAGNOSIS — I12.9 HYPERTENSIVE CHRONIC KIDNEY DISEASE WITH STAGE 1 THROUGH STAGE 4 CHRONIC KIDNEY DISEASE, OR UNSPECIFIED CHRONIC KIDNEY DISEASE: ICD-10-CM

## 2020-04-09 DIAGNOSIS — N18.4 CHRONIC KIDNEY DISEASE, STAGE 4 (SEVERE): ICD-10-CM

## 2020-04-09 DIAGNOSIS — R50.9 FEVER, UNSPECIFIED: ICD-10-CM

## 2020-04-09 DIAGNOSIS — I25.10 ATHEROSCLEROTIC HEART DISEASE OF NATIVE CORONARY ARTERY WITHOUT ANGINA PECTORIS: ICD-10-CM

## 2020-04-09 DIAGNOSIS — R79.89 OTHER SPECIFIED ABNORMAL FINDINGS OF BLOOD CHEMISTRY: ICD-10-CM

## 2020-04-09 DIAGNOSIS — A08.39 OTHER VIRAL ENTERITIS: ICD-10-CM

## 2020-04-09 DIAGNOSIS — E11.22 TYPE 2 DIABETES MELLITUS WITH DIABETIC CHRONIC KIDNEY DISEASE: ICD-10-CM

## 2020-04-09 DIAGNOSIS — E86.0 DEHYDRATION: ICD-10-CM

## 2020-04-09 DIAGNOSIS — A41.9 SEPSIS, UNSPECIFIED ORGANISM: ICD-10-CM

## 2020-04-09 DIAGNOSIS — E03.9 HYPOTHYROIDISM, UNSPECIFIED: ICD-10-CM

## 2020-04-09 DIAGNOSIS — N17.9 ACUTE KIDNEY FAILURE, UNSPECIFIED: ICD-10-CM

## 2020-04-21 ENCOUNTER — APPOINTMENT (OUTPATIENT)
Dept: VASCULAR SURGERY | Facility: CLINIC | Age: 75
End: 2020-04-21

## 2021-09-10 NOTE — ED ADULT NURSE NOTE - NS ED NURSE PATIENT LEFT UNIT TIME
Cerebral Angiogram  -cbc, bmp, A1c, pt/ptt/inr, type and screen done at [facility]  -instructed to hold synjardy for 3 days prior to surgery  -preop instructions  -ABO day of procedure  -fingerstick on admit 14:38

## 2021-09-24 NOTE — PROGRESS NOTE ADULT - PROBLEM/PLAN-2
Rebekah Chopra,  from [employer] seen the pt today for her etoh history and cd treatment options. Rebekah Chopra stated the patient refused assistance and states she is not interested. [facility] Intervention Note was uploaded to this record. DISPLAY PLAN FREE TEXT

## 2022-10-04 NOTE — ED PROVIDER NOTE - NS ED ROS FT
Writer tried to call Corinna to see if she was still interested in outpatient therapy.  I left my name and phone number to call me to schedule if she was.  This would be a transfer to me from Pat Sadler.    Shivani Flores MS, LPC, [facility]    
General: Lethargy. Fever/chills.   Eyes:  No visual changes, eye pain or discharge.  ENMT:  No hearing changes, pain, no sore throat or runny nose, no difficulty swallowing  Cardiac: SOB. No CP.  Respiratory:  Dry cough. SOB.  GI:  No nausea, vomiting, diarrhea or abdominal pain.  :  No dysuria, frequency or burning.  MS:  No myalgia, muscle weakness, joint pain or back pain.  Neuro:  No headache.  No LOC. No change in ambulation. No dizziness.  Skin:  No skin rash.

## 2023-07-12 NOTE — GOALS OF CARE CONVERSATION - ADVANCED CARE PLANNING - DOES PATIENT HAVE ADVANCE DIRECTIVE
No Muscle Hinge Flap Text: The defect edges were debeveled with a #15 scalpel blade.  Given the size, depth and location of the defect and the proximity to free margins a muscle hinge flap was deemed most appropriate. Using a sterile surgical marker, an appropriate hinge flap was drawn incorporating the defect. The area thus outlined was incised with a #15 scalpel blade. The skin margins were undermined to an appropriate distance in all directions utilizing iris scissors. Following this, the designed flap was carried into the primary defect and sutured into place.

## 2023-10-09 NOTE — DIETITIAN INITIAL EVALUATION ADULT. - NS FNS WEIGHT USED FOR CALC
----- Message from Verena Villafana NP sent at 10/9/2023  3:03 PM CDT -----  Cholesterol, triglycerides are high and LDL borderline high. Kidney function normal and AST(liver) mildly elevated which has been the trend.  Recommend decreasing saturated fats/fried foods increasing fruits and vegetables.  Decreasing alcohol intake if this is a concern. Recommend repeating fasting labs in one year: lipid panel, CMP.   CBW: 143#/65kg, Ve: 10.1, Tmax: 37.3/other (specify)

## 2024-10-14 NOTE — PROGRESS NOTE ADULT - ATTENDING COMMENTS
Seen and examined with the pulmonary fellow at the bed side.  Impression and plan as outlined above. september 2024